# Patient Record
Sex: FEMALE | Race: WHITE | NOT HISPANIC OR LATINO | Employment: UNEMPLOYED | URBAN - METROPOLITAN AREA
[De-identification: names, ages, dates, MRNs, and addresses within clinical notes are randomized per-mention and may not be internally consistent; named-entity substitution may affect disease eponyms.]

---

## 2018-03-23 ENCOUNTER — OFFICE VISIT (OUTPATIENT)
Dept: OBGYN | Facility: CLINIC | Age: 28
End: 2018-03-23
Payer: COMMERCIAL

## 2018-03-23 VITALS
HEART RATE: 81 BPM | HEIGHT: 66 IN | BODY MASS INDEX: 19.09 KG/M2 | WEIGHT: 118.8 LBS | SYSTOLIC BLOOD PRESSURE: 108 MMHG | OXYGEN SATURATION: 99 % | DIASTOLIC BLOOD PRESSURE: 62 MMHG | TEMPERATURE: 97 F

## 2018-03-23 DIAGNOSIS — Z34.90 UNPLANNED PREGNANCY: ICD-10-CM

## 2018-03-23 DIAGNOSIS — N91.2 ABSENCE OF MENSTRUATION: Primary | ICD-10-CM

## 2018-03-23 LAB — BETA HCG QUAL IFA URINE: POSITIVE

## 2018-03-23 PROCEDURE — 99201 ZZC OFFICE/OUTPT VISIT, NEW, LEVEL I: CPT | Performed by: NURSE PRACTITIONER

## 2018-03-23 PROCEDURE — 84703 CHORIONIC GONADOTROPIN ASSAY: CPT | Performed by: NURSE PRACTITIONER

## 2018-03-23 ASSESSMENT — PAIN SCALES - GENERAL: PAINLEVEL: NO PAIN (0)

## 2018-03-23 NOTE — PROGRESS NOTES
"S: Pt is a 27 year old,  2 para 2 who presents today with absence of menses. LMP was 18.    This was an unplanned pregnancy and she does not plan to continue it. Would like to discuss termination procedures.   Pertinent Past Obstetric and Gynecological Medical History:  x 2-1  delivery at 35 weeks due to  labor   Patient past medical, surgical, family, and social history reviewed.    O: General appearance: Well appearing female in no acute distress. Answers questions and maintains eye contact appropriately.  Exam declined  UPT Positive    A/P:  (N91.2) Absence of menstruation  (primary encounter diagnosis)  Comment: Patient confirmed she does not plan to continue this pregnancy and wants it \"dealt with\" as soon as possible. We discussed termination options-surgical vs medical. Handout regarding locations and phone numbers she can call to schedule an appointment given. Patient encouraged to please reach out to me if she has any questions, concerns. Also, can return to clinic at anytime to establish prenatal care in the event she changes her mind.  Plan: Beta HCG qual IFA urine - [facility] and [place]          (Z34.90) Unplanned pregnancy  Comment: see above    Lisa MCPHERSON CNP      "

## 2018-03-23 NOTE — MR AVS SNAPSHOT
"              After Visit Summary   3/23/2018    Vi Zayas    MRN: [id number]           Patient Information     Date Of Birth          1990        Visit Information        Provider Department      3/23/2018 1:35 PM Lisa Zaragoza APRN CNP; [facility]        Today's Diagnoses     Absence of menstruation    -  1    Unplanned pregnancy           Follow-ups after your visit        Who to contact     If you have questions or need follow up information about today's clinic visit or your schedule please contact [facility] directly at [phone].  Normal or non-critical lab and imaging results will be communicated to you by [employer], letter or phone within 4 business days after the clinic has received the results. If you do not hear from us within 7 days, please contact the clinic through [employer] or phone. If you have a critical or abnormal lab result, we will notify you by phone as soon as possible.  Submit refill requests through [employer] or call your pharmacy and they will forward the refill request to us. Please allow 3 business days for your refill to be completed.          Additional Information About Your Visit        MyChart Information     [employer] lets you send messages to your doctor, view your test results, renew your prescriptions, schedule appointments and more. To sign up, go to [email or web address] . Click on \"Log in\" on the left side of the screen, which will take you to the Welcome page. Then click on \"Sign up Now\" on the right side of the page.     You will be asked to enter the access code listed below, as well as some personal information. Please follow the directions to create your username and password.     Your access code is: [email or web address]  Expires: 2018  2:29 PM     Your access code will  in 90 days. If you need help or a new code, please call your [facility]) or [phone].        Care EveryWhere ID     This is your Care " "EveryWhere ID. This could be used by other organizations to access your [facility] medical records  [id number]        Your Vitals Were     Pulse Temperature Height Last Period Pulse Oximetry BMI (Body Mass Index)    81 97  F (36.1  C) (Oral) 5' 6.25\" (1.683 m) 02/19/2018 (Exact Date) 99% 19.03 kg/m2       Blood Pressure from Last 3 Encounters:   03/23/18 108/62    Weight from Last 3 Encounters:   03/23/18 118 lb 12.8 oz (53.9 kg)              We Performed the Following     Beta HCG qual IFA urine - [facility] and [facility]        Primary Care Provider Office Phone # Fax #    [facility] [phone] [address] [facility] [address]        Equal Access to Services     ANDREW RUIZ : Hadii aad ku hadasho Sodomenico, waaxda luqadaha, qaybta kaalmada adeegyada, paul bullock . So [facility] [phone].    ATENCIÓN: Si habla español, tiene a bhardwaj disposición servicios gratuitos de asistencia lingüística. Brandoname al [phone].    We comply with applicable federal civil rights laws and [place] laws. We do not discriminate on the basis of race, color, national origin, age, disability, sex, sexual orientation, or gender identity.            Thank you!     Thank you for choosing [facility]  for your care. Our goal is always to provide you with excellent care. Hearing back from our patients is one way we can continue to improve our services. Please take a few minutes to complete the written survey that you may receive in the mail after your visit with us. Thank you!             Your Updated Medication List - Protect others around you: Learn how to safely use, store and throw away your medicines at www.disposemymeds.org.      Notice  As of 3/23/2018  2:29 PM    You have not been prescribed any medications.      "

## 2018-03-23 NOTE — NURSING NOTE
"Chief Complaint   Patient presents with     Confirmation Of Pregnancy       Initial /62  Pulse 81  Temp 97  F (36.1  C) (Oral)  Ht 5' 6.25\" (1.683 m)  Wt 118 lb 12.8 oz (53.9 kg)  LMP 02/19/2018 (Exact Date)  SpO2 99%  BMI 19.03 kg/m2 Estimated body mass index is 19.03 kg/(m^2) as calculated from the following:    Height as of this encounter: 5' 6.25\" (1.683 m).    Weight as of this encounter: 118 lb 12.8 oz (53.9 kg)..  BP completed using cuff size: nestor Samuel CMA    "

## 2018-03-24 ASSESSMENT — PATIENT HEALTH QUESTIONNAIRE - PHQ9: SUM OF ALL RESPONSES TO PHQ QUESTIONS 1-9: 12

## 2019-04-30 ENCOUNTER — OFFICE VISIT (OUTPATIENT)
Dept: OBGYN | Facility: CLINIC | Age: 29
End: 2019-04-30
Payer: COMMERCIAL

## 2019-04-30 VITALS
HEIGHT: 66 IN | WEIGHT: 119.6 LBS | DIASTOLIC BLOOD PRESSURE: 70 MMHG | SYSTOLIC BLOOD PRESSURE: 102 MMHG | BODY MASS INDEX: 19.22 KG/M2 | OXYGEN SATURATION: 97 % | TEMPERATURE: 98.2 F | HEART RATE: 78 BPM

## 2019-04-30 DIAGNOSIS — Z30.41 ENCOUNTER FOR SURVEILLANCE OF CONTRACEPTIVE PILLS: Primary | ICD-10-CM

## 2019-04-30 PROCEDURE — 99213 OFFICE O/P EST LOW 20 MIN: CPT | Performed by: NURSE PRACTITIONER

## 2019-04-30 RX ORDER — NORGESTIMATE AND ETHINYL ESTRADIOL 0.25-0.035
1 KIT ORAL DAILY
COMMUNITY
End: 2019-04-30

## 2019-04-30 RX ORDER — NORGESTIMATE AND ETHINYL ESTRADIOL 0.25-0.035
1 KIT ORAL DAILY
Qty: 84 TABLET | Refills: 1 | Status: SHIPPED | OUTPATIENT
Start: 2019-04-30 | End: 2019-10-19

## 2019-04-30 ASSESSMENT — PAIN SCALES - GENERAL: PAINLEVEL: NO PAIN (0)

## 2019-04-30 ASSESSMENT — MIFFLIN-ST. JEOR: SCORE: 1303.22

## 2019-04-30 NOTE — PROGRESS NOTES
"  SUBJECTIVE:   Vi Zayas is a 28 year old female who presents to clinic today for the following   health issues:      Medication Followup of Ortho Cyclen    Taking Medication as prescribed: yes    Side Effects:  None    Medication Helping Symptoms:  yes       Patient was seen for pregnancy test last March and test was positive. Per visit notes, was planning on termination as it was an unplanned pregnancy. Patient will not say for sure if she terminated or miscarried. She was started on oral contraceptive pills by another provider and needs refills at this time. She will not do a pap smear or preventative exam, just requests refills on her current regimen. No trouble remembering to take it regularly, no adverse side effects. Cycles are regular.  No other concerns today.     Additional history: as documented    Reviewed  and updated as needed this visit by clinical staff  Tobacco  Meds  Med Hx  Surg Hx  Fam Hx  Soc Hx        Reviewed and updated as needed this visit by Provider         There is no problem list on file for this patient.    Past Surgical History:   Procedure Laterality Date     NO HISTORY OF SURGERY         Social History     Tobacco Use     Smoking status: Never Smoker     Smokeless tobacco: Never Used   Substance Use Topics     Alcohol use: No     Family History   Problem Relation Age of Onset     Stomach Cancer Paternal Grandmother            ROS:  Constitutional, HEENT, cardiovascular, pulmonary, gi and gu systems are negative, except as otherwise noted.    OBJECTIVE:     /70 (BP Location: Right arm, Patient Position: Sitting, Cuff Size: Adult Regular)   Pulse 78   Temp 98.2  F (36.8  C) (Oral)   Ht 1.683 m (5' 6.25\")   Wt 54.3 kg (119 lb 9.6 oz)   LMP 04/09/2019 (Exact Date)   SpO2 97%   BMI 19.16 kg/m    Body mass index is 19.16 kg/m .  GENERAL: healthy, alert and no distress  RESP: lungs clear to auscultation - no rales, rhonchi or wheezes  CV: regular rate and rhythm, normal " S1 S2, no S3 or S4, no murmur, click or rub, no peripheral edema and peripheral pulses strong  ABDOMEN: soft, nontender, no hepatosplenomegaly, no masses and bowel sounds normal   (female): patient declined  MS: no gross musculoskeletal defects noted, no edema  SKIN: no suspicious lesions or rashes  PSYCH: mentation appears normal, affect normal/bright    ASSESSMENT/PLAN:   1. Encounter for surveillance of contraceptive pills  Will refill at this time. Discussed recommendation for annual wellness exam and also pap smear-she declines both, but will think about it in the next few months. Encouraged to return to clinic if she would like to complete this.   - norgestimate-ethinyl estradiol (ORTHO-CYCLEN/SPRINTEC) 0.25-35 MG-MCG tablet; Take 1 tablet by mouth daily  Dispense: 84 tablet; Refill: 1    KY Ziegler [facility]

## 2019-04-30 NOTE — PROGRESS NOTES
"  SUBJECTIVE:   Vi Zayas is a 28 year old female who presents to clinic today for the following   health issues:      Birth control counseling    {additional problems for provider to add:197730}    Additional history: {NONE - AS DOCUMENTED:367815::\"as documented\"}    Reviewed  and updated as needed this visit by clinical staff         Reviewed and updated as needed this visit by Provider         {HIST REVIEW/ LINKS 2:377623}    {PROVIDER CHARTING PREFERENCE:047997}      "

## 2019-06-18 ENCOUNTER — OFFICE VISIT (OUTPATIENT)
Dept: FAMILY MEDICINE | Facility: CLINIC | Age: 29
End: 2019-06-18
Payer: COMMERCIAL

## 2019-06-18 VITALS
TEMPERATURE: 98.4 F | DIASTOLIC BLOOD PRESSURE: 70 MMHG | SYSTOLIC BLOOD PRESSURE: 107 MMHG | WEIGHT: 122 LBS | HEART RATE: 77 BPM | BODY MASS INDEX: 19.54 KG/M2

## 2019-06-18 DIAGNOSIS — R30.0 DYSURIA: ICD-10-CM

## 2019-06-18 DIAGNOSIS — N30.00 ACUTE CYSTITIS WITHOUT HEMATURIA: Primary | ICD-10-CM

## 2019-06-18 DIAGNOSIS — R82.90 NONSPECIFIC FINDING ON EXAMINATION OF URINE: ICD-10-CM

## 2019-06-18 LAB
ALBUMIN UR-MCNC: NEGATIVE MG/DL
APPEARANCE UR: CLEAR
BILIRUB UR QL STRIP: NEGATIVE
COLOR UR AUTO: YELLOW
GLUCOSE UR STRIP-MCNC: NEGATIVE MG/DL
HGB UR QL STRIP: ABNORMAL
KETONES UR STRIP-MCNC: NEGATIVE MG/DL
LEUKOCYTE ESTERASE UR QL STRIP: ABNORMAL
NITRATE UR QL: NEGATIVE
PH UR STRIP: 7 PH (ref 5–7)
RBC #/AREA URNS AUTO: ABNORMAL /HPF
SOURCE: ABNORMAL
SP GR UR STRIP: <=1.005 (ref 1–1.03)
UROBILINOGEN UR STRIP-ACNC: 0.2 EU/DL (ref 0.2–1)
WBC #/AREA URNS AUTO: ABNORMAL /HPF

## 2019-06-18 PROCEDURE — 81001 URINALYSIS AUTO W/SCOPE: CPT | Performed by: PHYSICIAN ASSISTANT

## 2019-06-18 PROCEDURE — 99203 OFFICE O/P NEW LOW 30 MIN: CPT | Performed by: PHYSICIAN ASSISTANT

## 2019-06-18 PROCEDURE — 87086 URINE CULTURE/COLONY COUNT: CPT | Performed by: PHYSICIAN ASSISTANT

## 2019-06-18 RX ORDER — SULFAMETHOXAZOLE/TRIMETHOPRIM 800-160 MG
1 TABLET ORAL 2 TIMES DAILY
Qty: 6 TABLET | Refills: 0 | Status: SHIPPED | OUTPATIENT
Start: 2019-06-18 | End: 2019-06-21

## 2019-06-18 NOTE — PROGRESS NOTES
Subjective     Vi Zayas is a 28 year old female who presents to clinic today for the following health issues:    HPI   URINARY TRACT SYMPTOMS      Duration: X 3 days    Description  Burning with urination, frequency    Intensity:  moderate    Accompanying signs and symptoms:  Fever/chills: no   Flank pain no   Nausea and vomiting: no   Vaginal symptoms: discharge- slight  Abdominal/Pelvic Pain: no     History  History of frequent UTI's: no   History of kidney stones: no   Sexually Active: no   Possibility of pregnancy: No    Precipitating or alleviating factors: None    Therapies tried and outcome: none  LMP- 1 week ago, on OCP's. No missed pills     [place]  FMHX- no diabetes      No Known Allergies    Past Medical History:   Diagnosis Date     NO ACTIVE PROBLEMS          Current Outpatient Medications on File Prior to Visit:  norgestimate-ethinyl estradiol (ORTHO-CYCLEN/SPRINTEC) 0.25-35 MG-MCG tablet Take 1 tablet by mouth daily     No current facility-administered medications on file prior to visit.     Social History     Tobacco Use     Smoking status: Never Smoker     Smokeless tobacco: Never Used   Substance Use Topics     Alcohol use: No     Drug use: No       ROS:  CONSTITUTIONAL: Negative for fatigue or fever.  EYES: Negative for eye problems.  ENT: neg.  RESP: neg.  CV: Negative for chest pains.  GI: Negative for vomiting.  MUSCULOSKELETAL:  Negative for significant muscle or joint pains.  NEUROLOGIC: Negative for headaches.  SKIN: Negative for rash.  - as above  Psych- nl mentation        OBJECTIVE:  /70   Pulse 77   Temp 98.4  F (36.9  C) (Oral)   Wt 55.3 kg (122 lb)   BMI 19.54 kg/m     General:   awake, alert, and cooperative.  NAD.   Head: Normocephalic, atraumatic.  Eyes: Conjunctiva clear, non icteric.   ABD: soft, no tenderness to palpation , no rigidity, guarding or rebound . No CVAT  Neuro: Alert and oriented - normal speech.   Results for  orders placed or performed in visit on 06/18/19   *UA reflex to Microscopic and Culture ([facility] (except [place] and [place])   Result Value Ref Range    Color Urine Yellow     Appearance Urine Clear     Glucose Urine Negative NEG^Negative mg/dL    Bilirubin Urine Negative NEG^Negative    Ketones Urine Negative NEG^Negative mg/dL    Specific Gravity Urine <=1.005 1.003 - 1.035    Blood Urine Small (A) NEG^Negative    pH Urine 7.0 5.0 - 7.0 pH    Protein Albumin Urine Negative NEG^Negative mg/dL    Urobilinogen Urine 0.2 0.2 - 1.0 EU/dL    Nitrite Urine Negative NEG^Negative    Leukocyte Esterase Urine Moderate (A) NEG^Negative    Source Midstream Urine    Urine Microscopic   Result Value Ref Range    WBC Urine 5-10 (A) OTO5^0 - 5 /HPF    RBC Urine O - 2 OTO2^O - 2 /HPF       ASSESSMENT:      ICD-10-CM    1. Acute cystitis without hematuria N30.00 sulfamethoxazole-trimethoprim (BACTRIM DS) 800-160 MG tablet   2. Dysuria R30.0 *UA reflex to Microscopic and Culture ([facility] and [facility] (except [place] and [place])     Urine Microscopic   3. Nonspecific finding on examination of urine R82.90 Urine Culture Aerobic Bacterial           PLAN: UC pending  As per ordered above.  Drink plenty of fluids.  Prevention and treatment of UTI's discussed. Follow up with primary care physician 3-5 days if not improving.  Advised about symptoms which might herald more serious problems.      Vandana Dunn PA-C

## 2019-06-18 NOTE — LETTER
June 20, 2019    Vi Zayas  [address]  [place] [address]        Dear Vi,    Your urine culture was negative. You may stop the antibiotic and follow up with your Primary Care Provider if no resolution of symptoms.        Vandana Dunn PA-C    Results for orders placed or performed in visit on 06/18/19   *UA reflex to Microscopic and Culture ([facility] and [facility] (except [place] and [place])   Result Value Ref Range    Color Urine Yellow     Appearance Urine Clear     Glucose Urine Negative NEG^Negative mg/dL    Bilirubin Urine Negative NEG^Negative    Ketones Urine Negative NEG^Negative mg/dL    Specific Gravity Urine <=1.005 1.003 - 1.035    Blood Urine Small (A) NEG^Negative    pH Urine 7.0 5.0 - 7.0 pH    Protein Albumin Urine Negative NEG^Negative mg/dL    Urobilinogen Urine 0.2 0.2 - 1.0 EU/dL    Nitrite Urine Negative NEG^Negative    Leukocyte Esterase Urine Moderate (A) NEG^Negative    Source Midstream Urine    Urine Microscopic   Result Value Ref Range    WBC Urine 5-10 (A) OTO5^0 - 5 /HPF    RBC Urine O - 2 OTO2^O - 2 /HPF   Urine Culture Aerobic Bacterial   Result Value Ref Range    Specimen Description Midstream Urine     Culture Micro No growth

## 2019-06-19 LAB
BACTERIA SPEC CULT: NO GROWTH
SPECIMEN SOURCE: NORMAL

## 2019-10-19 DIAGNOSIS — Z30.41 ENCOUNTER FOR SURVEILLANCE OF CONTRACEPTIVE PILLS: ICD-10-CM

## 2019-10-21 RX ORDER — NORGESTIMATE AND ETHINYL ESTRADIOL 0.25-0.035
KIT ORAL
Qty: 84 TABLET | Refills: 0 | Status: SHIPPED | OUTPATIENT
Start: 2019-10-21 | End: 2020-01-08

## 2019-10-21 NOTE — TELEPHONE ENCOUNTER
"Contraceptives Protocol Passed   ESTARYLLA 0.25-35 MG-MCG tablet [Pharmacy Med Name: ESTARYLLA TABLETS 28S]   Rerun Protocol (10/21/2019 3:46 PM)      Patient is not a current smoker if age is 35 or older         Recent (12 mo) or future (30 days) visit within the authorizing provider's specialty      Patient has had an office visit with the authorizing provider or a provider within the authorizing providers department within the previous 12 mos or has a future within next 30 days. See \"Patient Info\" tab in inbasket, or \"Choose Columns\" in Meds & Orders section of the refill encounter.              Medication is active on med list         No active pregnancy on record         No positive pregnancy test in past 12 months             Prescription approved per [facility] Refill Protocol.      Last written prescription date: 4/30/2019        Last fill quantity: 84, # refills: 1        Last office visit: 4/30/2019        Future office visit: Not scheduled.     Yael Arellano RN on 10/21/2019 at 4:00 PM    "

## 2020-01-08 DIAGNOSIS — Z30.41 ENCOUNTER FOR SURVEILLANCE OF CONTRACEPTIVE PILLS: ICD-10-CM

## 2020-01-08 RX ORDER — NORGESTIMATE AND ETHINYL ESTRADIOL 0.25-0.035
KIT ORAL
Qty: 84 TABLET | Refills: 0 | Status: SHIPPED | OUTPATIENT
Start: 2020-01-08 | End: 2020-05-27

## 2020-01-08 NOTE — TELEPHONE ENCOUNTER
"Contraceptives Protocol Passed   ESTARYLLA 0.25-35 MG-MCG tablet [Pharmacy Med Name: ESTARYLLA TABLETS 28S]   Rerun Protocol (1/8/2020 11:19 AM)      Patient is not a current smoker if age is 35 or older         Recent (12 mo) or future (30 days) visit within the authorizing provider's specialty      Patient has had an office visit with the authorizing provider or a provider within the authorizing providers department within the previous 12 mos or has a future within next 30 days. See \"Patient Info\" tab in inbasket, or \"Choose Columns\" in Meds & Orders section of the refill encounter.              Medication is active on med list         No active pregnancy on record         No positive pregnancy test in past 12 months               Last written prescription date: 10/21/2019        Last fill quantity: 84, # refills: 0        Last office visit: 4/330/2019        Future office visit: Not scheduled.       Prescription approved per [facility] Refill Protocol.  Patient will be due for annual exam come April 2020.     Yael Arellano RN on 1/8/2020 at 11:32 AM    "

## 2020-05-26 DIAGNOSIS — Z30.41 ENCOUNTER FOR SURVEILLANCE OF CONTRACEPTIVE PILLS: ICD-10-CM

## 2020-05-27 RX ORDER — NORGESTIMATE AND ETHINYL ESTRADIOL 0.25-0.035
KIT ORAL
Qty: 84 TABLET | Refills: 0 | Status: SHIPPED | OUTPATIENT
Start: 2020-05-27 | End: 2020-08-26

## 2020-05-27 NOTE — TELEPHONE ENCOUNTER
"Requested Prescriptions   Pending Prescriptions Disp Refills     ESTARYLLA 0.25-35 MG-MCG tablet [Pharmacy Med Name: ESTARYLLA TABLETS 28S] 84 tablet 0     Sig: TAKE 1 TABLET BY MOUTH DAILY       Contraceptives Protocol Failed - 5/27/2020 11:50 AM        Failed - Recent (12 mo) or future (30 days) visit within the authorizing provider's specialty     Patient has had an office visit with the authorizing provider or a provider within the authorizing providers department within the previous 12 mos or has a future within next 30 days. See \"Patient Info\" tab in inbasket, or \"Choose Columns\" in Meds & Orders section of the refill encounter.              Passed - Patient is not a current smoker if age is 35 or older        Passed - Medication is active on med list        Passed - No active pregnancy on record        Passed - No positive pregnancy test in past 12 months           Routing refill request to provider for review/approval because:  Patient needs to be seen because it has been more than 1 year since last office visit.  No pap on file.  Per 4/30/2019 OV notes:  She was started on oral contraceptive pills by another provider and needs refills at this time. She will not do a pap smear or preventative exam, just requests refills on her current regimen. No trouble remembering to take it regularly, no adverse side effects  1. Encounter for surveillance of contraceptive pills  Will refill at this time. Discussed recommendation for annual wellness exam and also pap smear-she declines both, but will think about it in the next few months. Encouraged to return to clinic if she would like to complete this.   Pt has not followed up since.    Sharron Olson RN          "

## 2020-08-21 DIAGNOSIS — Z30.41 ENCOUNTER FOR SURVEILLANCE OF CONTRACEPTIVE PILLS: ICD-10-CM

## 2020-08-24 RX ORDER — NORGESTIMATE AND ETHINYL ESTRADIOL 0.25-0.035
KIT ORAL
OUTPATIENT
Start: 2020-08-24

## 2020-08-24 NOTE — TELEPHONE ENCOUNTER
"Contraceptives Protocol Failed     Rerun Protocol  (8/24/2020 11:27 AM)       Recent (12 mo) or future (30 days) visit within the authorizing provider's specialty     Patient has had an office visit with the authorizing provider or a provider within the authorizing providers department within the previous 12 mos or has a future within next 30 days. See \"Patient Info\" tab in inbasket, or \"Choose Columns\" in Meds & Orders section of the refill encounter.              Patient is not a current smoker if age is 35 or older         Medication is active on med list         No active pregnancy on record         No positive pregnancy test in past 12 months           Pt last seen 4/30/2019 for surveillance of OCPs.    Last prescribed on 5/27/2020 for 84 tablets with 0 refills.    RN called pt with  and assisted in scheduling annual exam with Lisa. Pt states she has enough medication to get to her appt.    Patient verbalized understanding and agreed to plan.   Patient was given the opportunity to ask additional questions and have them answered. Patient had no further questions.     Kelsy Dumas RN on 8/24/2020 at 11:37 AM    "

## 2020-08-26 ENCOUNTER — OFFICE VISIT (OUTPATIENT)
Dept: OBGYN | Facility: CLINIC | Age: 30
End: 2020-08-26
Payer: COMMERCIAL

## 2020-08-26 VITALS
HEIGHT: 67 IN | SYSTOLIC BLOOD PRESSURE: 127 MMHG | WEIGHT: 117.4 LBS | BODY MASS INDEX: 18.43 KG/M2 | OXYGEN SATURATION: 98 % | HEART RATE: 112 BPM | TEMPERATURE: 99 F | DIASTOLIC BLOOD PRESSURE: 83 MMHG

## 2020-08-26 DIAGNOSIS — Z01.419 ENCOUNTER FOR GYNECOLOGICAL EXAMINATION WITHOUT ABNORMAL FINDING: Primary | ICD-10-CM

## 2020-08-26 DIAGNOSIS — Z30.41 ENCOUNTER FOR SURVEILLANCE OF CONTRACEPTIVE PILLS: ICD-10-CM

## 2020-08-26 PROCEDURE — 99395 PREV VISIT EST AGE 18-39: CPT | Performed by: NURSE PRACTITIONER

## 2020-08-26 PROCEDURE — G0145 SCR C/V CYTO,THINLAYER,RESCR: HCPCS | Performed by: NURSE PRACTITIONER

## 2020-08-26 RX ORDER — NORGESTIMATE AND ETHINYL ESTRADIOL 0.25-0.035
1 KIT ORAL DAILY
Qty: 84 TABLET | Refills: 3 | Status: SHIPPED | OUTPATIENT
Start: 2020-08-26 | End: 2021-07-23

## 2020-08-26 ASSESSMENT — PAIN SCALES - GENERAL: PAINLEVEL: NO PAIN (0)

## 2020-08-26 ASSESSMENT — MIFFLIN-ST. JEOR: SCORE: 1287.21

## 2020-08-26 NOTE — LETTER
September 8, 2020      Vi Zayas  [address]  [place] [address]    Dear ,      I am happy to inform you that your recent cervical cancer screening test (PAP smear) was normal.      Preventative screenings such as this help to ensure your health for years to come. You should repeat a pap smear in 3 years, unless otherwise directed.      You will still need to return to the clinic every year for your annual exam and other preventive tests.     If you have additional questions regarding this result, please call our registered nurseSu at [phone].      Sincerely,      [place] Ziegler CNP/rlm

## 2020-08-26 NOTE — PROGRESS NOTES
SUBJECTIVE:   CC: Vi Zayas is an 30 year old woman who presents for preventive health visit.   Patient declined interpretor for the visit today.    Healthy Habits:    Do you get at least three servings of calcium containing foods daily (dairy, green leafy vegetables, etc.)? yes    Amount of exercise or daily activities, outside of work: 5 day(s) per week    Problems taking medications regularly No    Medication side effects: No    Have you had an eye exam in the past two years? no    Do you see a dentist twice per year? yes    Do you have sleep apnea, excessive snoring or daytime drowsiness?no          Today's PHQ-2 Score:   PHQ-2 ( 1999 Pf[employer]) 8/26/2020   Q1: Little interest or pleasure in doing things 0   Q2: Feeling down, depressed or hopeless 0   PHQ-2 Score 0       Abuse: Current or Past(Physical, Sexual or Emotional)- No  Do you feel safe in your environment? Yes        Social History     Tobacco Use     Smoking status: Never Smoker     Smokeless tobacco: Never Used   Substance Use Topics     Alcohol use: No     If you drink alcohol do you typically have >3 drinks per day or >7 drinks per week? No                     Reviewed orders with patient.  Reviewed health maintenance and updated orders accordingly - Yes  There is no problem list on file for this patient.    Past Surgical History:   Procedure Laterality Date     NO HISTORY OF SURGERY         Social History     Tobacco Use     Smoking status: Never Smoker     Smokeless tobacco: Never Used   Substance Use Topics     Alcohol use: No     Family History   Problem Relation Age of Onset     Stomach Cancer Paternal Grandmother            Mammogram not appropriate for this patient based on age.    Pertinent mammograms are reviewed under the imaging tab.  History of abnormal Pap smear: NO - age 21-29 PAP every 3 years recommended     Reviewed and updated as needed this visit by clinical staff  Tobacco  Allergies  Meds  Med Hx  Surg Hx  Fam Hx  Soc Hx  "       Reviewed and updated as needed this visit by Provider        Past Medical History:   Diagnosis Date     NO ACTIVE PROBLEMS       Past Surgical History:   Procedure Laterality Date     NO HISTORY OF SURGERY         ROS:  CONSTITUTIONAL: NEGATIVE for fever, chills, change in weight  INTEGUMENTARU/SKIN: NEGATIVE for worrisome rashes, moles or lesions  EYES: NEGATIVE for vision changes or irritation  ENT: NEGATIVE for ear, mouth and throat problems  RESP: NEGATIVE for significant cough or SOB  BREAST: NEGATIVE for masses, tenderness or discharge  CV: NEGATIVE for chest pain, palpitations or peripheral edema  GI: NEGATIVE for nausea, abdominal pain, heartburn, or change in bowel habits  : NEGATIVE for unusual urinary or vaginal symptoms. Periods are regular.  MUSCULOSKELETAL: NEGATIVE for significant arthralgias or myalgia  NEURO: NEGATIVE for weakness, dizziness or paresthesias  PSYCHIATRIC: NEGATIVE for changes in mood or affect    OBJECTIVE:   /83 (BP Location: Right arm, Patient Position: Sitting, Cuff Size: Adult Regular)   Pulse 112   Temp 99  F (37.2  C) (Tympanic)   Ht 1.689 m (5' 6.5\")   Wt 53.3 kg (117 lb 6.4 oz)   LMP 08/12/2020 (Exact Date)   SpO2 98%   BMI 18.66 kg/m    EXAM:  GENERAL: healthy, alert and no distress  EYES: Eyes grossly normal to inspection, PERRL and conjunctivae and sclerae normal  HENT: ear canals and TM's normal, nose and mouth without ulcers or lesions  NECK: no adenopathy, no asymmetry, masses, or scars and thyroid normal to palpation  RESP: lungs clear to auscultation - no rales, rhonchi or wheezes  BREAST: normal without masses, tenderness or nipple discharge and no palpable axillary masses or adenopathy  CV: regular rate and rhythm, normal S1 S2, no S3 or S4, no murmur, click or rub, no peripheral edema and peripheral pulses strong  ABDOMEN: soft, nontender, no hepatosplenomegaly, no masses and bowel sounds normal   (female): normal female external genitalia, " "normal urethral meatus, vaginal mucosa pink, moist, well rugated, and normal cervix/adnexa/uterus without masses or discharge  MS: no gross musculoskeletal defects noted, no edema  SKIN: no suspicious lesions or rashes  NEURO: Normal strength and tone, mentation intact and speech normal  PSYCH: mentation appears normal, affect normal/bright    ASSESSMENT/PLAN:   1. Encounter for gynecological examination without abnormal finding  - Pap imaged thin layer screen reflex to HPV if ASCUS - recommend age 25 - 29    2. Encounter for surveillance of contraceptive pills  - norgestimate-ethinyl estradiol (ESTARYLLA) 0.25-35 MG-MCG tablet; Take 1 tablet by mouth daily  Dispense: 84 tablet; Refill: 3    COUNSELING:   Reviewed preventive health counseling, as reflected in patient instructions  Special attention given to:        Regular exercise       Healthy diet/nutrition       Contraception       Safe sex practices/STD prevention       HIV screeninx in teen years, 1x in adult years, and at intervals if high risk    Estimated body mass index is 18.66 kg/m  as calculated from the following:    Height as of this encounter: 1.689 m (5' 6.5\").    Weight as of this encounter: 53.3 kg (117 lb 6.4 oz).         reports that she has never smoked. She has never used smokeless tobacco.      Counseling Resources:  ATP IV Guidelines  Pooled Cohorts Equation Calculator  Breast Cancer Risk Calculator  FRAX Risk Assessment  ICSI Preventive Guidelines  Dietary Guidelines for Americans,   USDA's MyPlate  ASA Prophylaxis  Lung CA Screening    [facility]  "

## 2020-08-31 LAB
COPATH REPORT: NORMAL
PAP: NORMAL

## 2021-02-02 ENCOUNTER — OFFICE VISIT (OUTPATIENT)
Dept: DERMATOLOGY | Facility: CLINIC | Age: 31
End: 2021-02-02
Payer: COMMERCIAL

## 2021-02-02 DIAGNOSIS — L70.0 ACNE VULGARIS: Primary | ICD-10-CM

## 2021-02-02 PROCEDURE — 99204 OFFICE O/P NEW MOD 45 MIN: CPT | Performed by: DERMATOLOGY

## 2021-02-02 ASSESSMENT — PAIN SCALES - GENERAL: PAINLEVEL: NO PAIN (0)

## 2021-02-02 NOTE — NURSING NOTE
"Vi Zayas's goals for this visit include:   Chief Complaint   Patient presents with     Derm Problem     Perioral erythema with spots x has had it for 2 years now (seasonal only); dry and itchy sometimes. She has tried \"baby creams\" to the areas in the past with no resolution. No current treatments.       New Patient     No personal of family hx of skin cancer        She requests these members of her care team be copied on today's visit information: Yes     PCP: Clinic, [place]    Referring Provider:  No referring provider defined for this encounter.    There were no vitals taken for this visit.    Do you need any medication refills at today's visit? No   [id number], MEDICAL ASSISTANT       "

## 2021-02-02 NOTE — PROGRESS NOTES
"[facility] Dermatology Note  Encounter Date: Feb 2, 2021  Office Visit     Dermatology Problem List:  1. No personal or family history of skin cancer   2. Acne vulgaris with possible dermatitis  - Current tx: metrocream    ____________________________________________    Assessment & Plan:  1. Acne vulgaris with possible perioral dermatitis, likely flared with mask use  - Start metrocream BID  - Hold baby creams  - Ok to continue Joceline lauder and Lancome beauty products   - Ok to continue Crest toothpaste. Counseled on use. Avoid leaving fluoride on askin  - Patient opts for in-person follow up in 6 weeks  -recommend cetaphil products    2. Plan total skin exam at follow up  Procedures Performed:   NA      Follow-up: 6 weeks, earlier for new or changing lesions.    Staff and Scribe:     Scribe Disclosure  I, Shalonda Orourke, am serving as a scribe to document services personally performed by Dr. Lousia Malone MD, based on data collection and the provider's statements to me.     Provider Disclosure:   The documentation recorded by the scribe accurately reflects the services I personally performed and the decisions made by me.    Louisa Malone MD    Department of Dermatology  [facility]: Phone: [phone], Fax:[phone]  [facility]: Phone: [phone], Fax: [phone]      ____________________________________________    CC: Derm Problem (Perioral erythema with spots x has had it for 2 years now (seasonal only); dry and itchy sometimes. She has tried \"baby creams\" to the areas in the past with no resolution. No current treatments.  ) and New Patient (No personal of family hx of skin cancer )    HPI:  Ms. Vi Zayas is a(n) 30 year old female who presents today as a new patient for perioral erythema. The patient has not been seen in the dermatology clinic before. " "    Today, the patient reports that she has had perioral erythema with spots for about 2 years now, seasonal only. This year she has had more spots present. It is like a rash, dry, itchy, and red sometimes. She has tried \"baby creams\" to areas in the past, but has not noticed any improvement. She is using an Joceline Lauder cream.     Not pregnant or breast feeding.       present via phone.     Patient is otherwise feeling well, without additional concerns.    Labs:  NA    Physical Exam:  Vitals: There were no vitals taken for this visit.  SKIN: Acne exam, which includes the face, neck, upper central chest, and upper central back was performed.  - Acneiform papules on the chin, bilateral nasolabial folds, and glabella.   - upper chest is clear, upper back is clear, central near neck line    - No other lesions of concern on areas examined.     Medications:  Current Outpatient Medications   Medication     norgestimate-ethinyl estradiol (ESTARYLLA) 0.25-35 MG-MCG tablet     No current facility-administered medications for this visit.       Past Medical History:   There is no problem list on file for this patient.    Past Medical History:   Diagnosis Date     NO ACTIVE PROBLEMS         CC No referring provider defined for this encounter. on close of this encounter.    "

## 2021-02-02 NOTE — PATIENT INSTRUCTIONS
[facility] Dermatology Visit    Thank you for allowing us to participate in your care. Your findings, instructions and follow-up plan are as follows:           When should I call my doctor?    If you are worsening or not improving, please, contact us or seek urgent care as noted below.     Who should I call with questions (adults)?    [facility] (adult and pediatric): [phone]     [facility] (adult): [phone]    For urgent needs outside of business hours call the [facility] at [phone] and ask for the dermatology resident on call    If this is a medical emergency and you are unable to reach an ER, Call 911      Who should I call with questions (pediatric)?  [facility]- Pediatric Dermatology  Dr. Darling Estrella, Dr. Eligio White, Dr. Ana Martins, Ursula Ba, PA  Dr. Joan Munguia, Dr. Armida Adrian & Dr. Camilo Aguayo  Non Urgent  Nurse Triage Line; [phone]- Elise and No RN Care Coordinators   Kristin (/Complex ) [phone]    If you need a prescription refill, please contact your pharmacy. Refills are approved or denied by our Physicians during normal business hours, Monday through Fridays  Per office policy, refills will not be granted if you have not been seen within the past year (or sooner depending on your child's condition)    Scheduling Information:  Pediatric Appointment Scheduling and Call Center [phone]  Radiology Scheduling- [phone]  Sedation Unit Scheduling- [phone]  [place] Scheduling- General [phone]; Pediatric Dermatology [phone]  Main  Services: [phone]  Algerian: [phone]  Panamanian: [phone]  Hmong/Georgian/Luxembourgish: [phone]  Preadmission Nursing Department Fax Number: [phone] (Fax all pre-operative paperwork to this number)    For urgent matters arising during evenings,  weekends, or holidays that cannot wait for normal business hours please call [phone] and ask for the Dermatology Resident On-Call to be paged.

## 2021-02-02 NOTE — LETTER
"    2/2/2021         RE: Vi Zayas  [address]  [place] [facility] [address]        Dear Colleague,    Thank you for referring your patient, Vi Zayas, to the [facility]. Please see a copy of my visit note below.    [facility] Dermatology Note  Encounter Date: Feb 2, 2021  Office Visit     Dermatology Problem List:  1. No personal or family history of skin cancer   2. Acne vulgaris with possible dermatitis  - Current tx: metrocream    ____________________________________________    Assessment & Plan:  1. Acne vulgaris with possible perioral dermatitis, likely flared with mask use  - Start metrocream BID  - Hold baby creams  - Ok to continue Joceline lauder and Lancome beauty products   - Ok to continue Crest toothpaste. Counseled on use. Avoid leaving fluoride on askin  - Patient opts for in-person follow up in 6 weeks  -recommend cetaphil products    2. Plan total skin exam at follow up  Procedures Performed:   NA      Follow-up: 6 weeks, earlier for new or changing lesions.    Staff and Scribe:     Scribe Disclosure  I, Shalonda Orourke, am serving as a scribe to document services personally performed by Dr. Louisa Malone MD, based on data collection and the provider's statements to me.     Provider Disclosure:   The documentation recorded by the scribe accurately reflects the services I personally performed and the decisions made by me.    Louisa Malone MD    Department of Dermatology  [facility]: Phone: [phone], Fax:[phone]  [facility]: Phone: [phone], Fax: [phone]      ____________________________________________    CC: Derm Problem (Perioral erythema with spots x has had it for 2 years now (seasonal only); dry and itchy sometimes. She has tried \"baby creams\" to the areas in the past with no resolution. No current treatments.  ) " "and New Patient (No personal of family hx of skin cancer )    HPI:  Ms. Vi Zayas is a(n) 30 year old female who presents today as a new patient for perioral erythema. The patient has not been seen in the dermatology clinic before.     Today, the patient reports that she has had perioral erythema with spots for about 2 years now, seasonal only. This year she has had more spots present. It is like a rash, dry, itchy, and red sometimes. She has tried \"baby creams\" to areas in the past, but has not noticed any improvement. She is using an Joceline Lauder cream.     Not pregnant or breast feeding.       present via phone.     Patient is otherwise feeling well, without additional concerns.    Labs:  NA    Physical Exam:  Vitals: There were no vitals taken for this visit.  SKIN: Acne exam, which includes the face, neck, upper central chest, and upper central back was performed.  - Acneiform papules on the chin, bilateral nasolabial folds, and glabella.   - upper chest is clear, upper back is clear, central near neck line    - No other lesions of concern on areas examined.     Medications:  Current Outpatient Medications   Medication     norgestimate-ethinyl estradiol (ESTARYLLA) 0.25-35 MG-MCG tablet     No current facility-administered medications for this visit.       Past Medical History:   There is no problem list on file for this patient.    Past Medical History:   Diagnosis Date     NO ACTIVE PROBLEMS         CC No referring provider defined for this encounter. on close of this encounter.        Again, thank you for allowing me to participate in the care of your patient.        Sincerely,        Louisa Malone MD    "

## 2021-03-16 ENCOUNTER — OFFICE VISIT (OUTPATIENT)
Dept: DERMATOLOGY | Facility: CLINIC | Age: 31
End: 2021-03-16
Payer: COMMERCIAL

## 2021-03-16 DIAGNOSIS — D48.5 NEOPLASM OF UNCERTAIN BEHAVIOR OF SKIN: ICD-10-CM

## 2021-03-16 DIAGNOSIS — R23.8 PAPULE: ICD-10-CM

## 2021-03-16 DIAGNOSIS — D22.9 MULTIPLE BENIGN NEVI: Primary | ICD-10-CM

## 2021-03-16 PROCEDURE — 11102 TANGNTL BX SKIN SINGLE LES: CPT | Mod: XS | Performed by: DERMATOLOGY

## 2021-03-16 PROCEDURE — 11300 SHAVE SKIN LESION 0.5 CM/<: CPT | Mod: XS | Performed by: DERMATOLOGY

## 2021-03-16 PROCEDURE — 88305 TISSUE EXAM BY PATHOLOGIST: CPT | Performed by: DERMATOLOGY

## 2021-03-16 PROCEDURE — 99212 OFFICE O/P EST SF 10 MIN: CPT | Mod: 25 | Performed by: DERMATOLOGY

## 2021-03-16 PROCEDURE — 11305 SHAVE SKIN LESION 0.5 CM/<: CPT | Performed by: DERMATOLOGY

## 2021-03-16 ASSESSMENT — PAIN SCALES - GENERAL: PAINLEVEL: NO PAIN (0)

## 2021-03-16 NOTE — PATIENT INSTRUCTIONS
Patient Education     Checking for Skin Cancer  You can find cancer early by checking your skin each month. There are 3 kinds of skin cancer. They are melanoma, basal cell carcinoma, and squamous cell carcinoma. Doing monthly skin checks is the best way to find new marks or skin changes. Follow the instructions below for checking your skin.   The ABCDEs of checking moles for melanoma   Check your moles or growths for signs of melanoma using ABCDE:     Asymmetry: the sides of the mole or growth don t match    Border: the edges are ragged, notched, or blurred    Color: the color within the mole or growth varies    Diameter: the mole or growth is larger than 6 mm (size of a pencil eraser)    Evolving: the size, shape, or color of the mole or growth is changing (evolving is not shown in the images below)    Checking for other types of skin cancer  Basal cell carcinoma or squamous cell carcinoma have symptoms such as:       A spot or mole that looks different from all other marks on your skin    Changes in how an area feels, such as itching, tenderness, or pain    Changes in the skin's surface, such as oozing, bleeding, or scaliness    A sore that does not heal    New swelling or redness beyond the border of a mole    Who s at risk?  Anyone can get skin cancer. But you are at greater risk if you have:     Fair skin, light-colored hair, or light-colored eyes    Many moles or abnormal moles on your skin    A history of sunburns from sunlight or tanning beds    A family history of skin cancer    A history of exposure to radiation or chemicals    A weakened immune system  If you have had skin cancer in the past, you are at risk for recurring skin cancer.   How to check your skin  Do your monthly skin checkups in front of a full-length mirror. Check all parts of your body, including your:     Head (ears, face, neck, and scalp)    Torso (front, back, and sides)    Arms (tops, undersides, upper, and lower armpits)    Hands  (palms, backs, and fingers, including under the nails)    Buttocks and genitals    Legs (front, back, and sides)    Feet (tops, soles, toes, including under the nails, and between toes)  If you have a lot of moles, take digital photos of them each month. Make sure to take photos both up close and from a distance. These can help you see if any moles change over time.   Most skin changes are not cancer. But if you see any changes in your skin, call your doctor right away. Only he or she can diagnose a problem. If you have skin cancer, seeing your doctor can be the first step toward getting the treatment that could save your life.   [employer] last reviewed this educational content on 4/1/2019 2000-2020 [employer]. [address] [place] [id number]. All rights reserved. This information is not intended as a substitute for professional medical care. Always follow your healthcare professional's instructions.       When should I call my doctor?    If you are worsening or not improving, please, contact us or seek urgent care as noted below.     Who should I call with questions (adults)?    [facility] (adult and pediatric): [phone]     [facility] (adult): [phone]    For urgent needs outside of business hours call the [facility] at [phone] and ask for the dermatology resident on call    If this is a medical emergency and you are unable to reach an ER, Call [phone]      Who should I call with questions (pediatric)?  [facility]- Pediatric Dermatology  Dr. Darling Estrella, Dr. Eligio White, Dr. Ana Martins, Ursula Ba, PA  Dr. Joan Munguia, Dr. Armida Adrian & Dr. Camilo Aguayo  Non Urgent  Nurse Triage Line; [phone]- Elise and No DEAN Care Coordinatorbryce Foster (/Complex ) [phone]    If you need a prescription refill, please contact your  pharmacy. Refills are approved or denied by our Physicians during normal business hours, Monday through Fridays  Per office policy, refills will not be granted if you have not been seen within the past year (or sooner depending on your child's condition)    Scheduling Information:  Pediatric Appointment Scheduling and Call Center [phone]  Radiology Scheduling- [phone]  Sedation Unit Scheduling- [phone]  [place] Scheduling- General [phone]; Pediatric Dermatology [phone]  Main  Services: [phone]  Colombian: [phone]  Nicaraguan: [phone]  Hmong/Scar/Palauan: [phone]  Preadmission Nursing Department Fax Number: [phone] (Fax all pre-operative paperwork to this number)    For urgent matters arising during evenings, weekends, or holidays that cannot wait for normal business hours please call [phone] and ask for the Dermatology Resident On-Call to be paged.     [facility] Dermatology Visit    Thank you for allowing us to participate in your care. Your findings, instructions and follow-up plan are as follows:     When should I call my doctor?    If you are worsening or not improving, please, contact us or seek urgent care as noted below.     Who should I call with questions (adults)?    [facility] (adult and pediatric): [phone]     [facility] (adult): [phone]    For urgent needs outside of business hours call the [facility] at [phone] and ask for the dermatology resident on call    If this is a medical emergency and you are unable to reach an ER, Call [phone]    Who should I call with questions (pediatric)?  [facility]- Pediatric Dermatology  Dr. Darling Estrella, Dr. Eligio White, Dr. Ana Martins, Ursula Ba, JUSTICE Munguia, Dr. Armida Adrian & Dr. Camilo Aguayo  Non Urgent  Nurse Triage Line; [phone]- Elies and No  RN Care Coordinators   Kristin (/Complex ) [phone]    If you need a prescription refill, please contact your pharmacy. Refills are approved or denied by our Physicians during normal business hours, Monday through Fridays  Per office policy, refills will not be granted if you have not been seen within the past year (or sooner depending on your child's condition)    Scheduling Information:  Pediatric Appointment Scheduling and Call Center [phone]  Radiology Scheduling- [phone]  Sedation Unit Scheduling- [phone]  [place] Scheduling- General [phone]; Pediatric Dermatology [phone]  Main  Services: [phone]  Yakut: [phone]  Uruguayan: [phone]  Hmong/Latvian/Vincent: [phone]  Preadmission Nursing Department Fax Number: [phone] (Fax all pre-operative paperwork to this number)    For urgent matters arising during evenings, weekends, or holidays that cannot wait for normal business hours please call [phone] and ask for the Dermatology Resident On-Call to be paged.      Wound Care After a Biopsy    What is a skin biopsy?  A skin biopsy allows the doctor to examine a very small piece of tissue under the microscope to determine the diagnosis and the best treatment for the skin condition. A local anesthetic (numbing medicine)  is injected with a very small needle into the skin area to be tested. A small piece of skin is taken from the area. Sometimes a suture (stitch) is used.     What are the risks of a skin biopsy?  I will experience scar, bleeding, swelling, pain, crusting and redness. I may experience incomplete removal or recurrence. Risks of this procedure are excessive bleeding, bruising, infection, nerve damage, numbness, thick (hypertrophic or keloidal) scar and non-diagnostic biopsy.    How should I care for my wound for the first 24 hours?    Keep the wound dry and covered for 24 hours    If it bleeds, hold direct  pressure on the area for 15 minutes. If bleeding does not stop then go to the emergency room    Avoid strenuous exercise the first 1-2 days or as your doctor instructs you    How should I care for the wound after 24 hours?    After 24 hours, remove the bandage    You may bathe or shower as normal    If you had a scalp biopsy, you can shampoo as usual and can use shower water to clean the biopsy site daily    Clean the wound twice a day with gentle soap and water    Do not scrub, be gentle    Apply white petroleum/Vaseline after cleaning the wound with a cotton swab or a clean finger, and keep the site covered with a Bandaid /bandage. Bandages are not necessary with a scalp biopsy    If you are unable to cover the site with a Bandaid /bandage, re-apply ointment 2-3 times a day to keep the site moist. Moisture will help with healing    Avoid strenuous activity for first 1-2 days    Avoid lakes, rivers, pools, and oceans until the stitches are removed or the site is healed    How do I clean my wound?    Wash hands thoroughly with soap or use hand  before all wound care    Clean the wound with gentle soap and water    Apply white petroleum/Vaseline  to wound after it is clean    Replace the Bandaid /bandage to keep the wound covered for the first few days or as instructed by your doctor    If you had a scalp biopsy, warm shower water to the area on a daily basis should suffice    What should I use to clean my wound?     Cotton-tipped applicators (Qtips )    White petroleum jelly (Vaseline ). Use a clean new container and use Q-tips to apply.    Bandaids   as needed    Gentle soap     How should I care for my wound long term?    Do not get your wound dirty    Keep up with wound care for one week or until the area is healed.    A small scab will form and fall off by itself when the area is completely healed. The area will be red and will become pink in color as it heals. Sun protection is very important for how  your scar will turn out. Sunscreen with an SPF 30 or greater is recommended once the area is healed.    You should have some soreness but it should be mild and slowly go away over several days. Talk to your doctor about using tylenol for pain,    When should I call my doctor?  If you have increased:     Pain or swelling    Pus or drainage (clear or slightly yellow drainage is ok)    Temperature over 100F    Spreading redness or warmth around wound    When will I hear about my results?  The biopsy results can take 2-3 weeks to come back. The clinic will call you with the results, send you a [employer] message, or have you schedule a follow-up clinic or phone time to discuss the results. Contact our clinics if you do not hear from us in 3 weeks.     Who should I call with questions?    [facility]: [phone]     [facility]: [phone]    For urgent needs outside of business hours call the [facility] at [phone] and ask for the dermatology resident on call  Wound Care After a Biopsy    What is a skin biopsy?  A skin biopsy allows the doctor to examine a very small piece of tissue under the microscope to determine the diagnosis and the best treatment for the skin condition. A local anesthetic (numbing medicine)  is injected with a very small needle into the skin area to be tested. A small piece of skin is taken from the area. Sometimes a suture (stitch) is used.     What are the risks of a skin biopsy?  I will experience scar, bleeding, swelling, pain, crusting and redness. I may experience incomplete removal or recurrence. Risks of this procedure are excessive bleeding, bruising, infection, nerve damage, numbness, thick (hypertrophic or keloidal) scar and non-diagnostic biopsy.    How should I care for my wound for the first 24 hours?    Keep the wound dry and covered for 24 hours    If it bleeds, hold direct pressure on the area for 15  minutes. If bleeding does not stop then go to the emergency room    Avoid strenuous exercise the first 1-2 days or as your doctor instructs you    How should I care for the wound after 24 hours?    After 24 hours, remove the bandage    You may bathe or shower as normal    If you had a scalp biopsy, you can shampoo as usual and can use shower water to clean the biopsy site daily    Clean the wound twice a day with gentle soap and water    Do not scrub, be gentle    Apply white petroleum/Vaseline after cleaning the wound with a cotton swab or a clean finger, and keep the site covered with a Bandaid /bandage. Bandages are not necessary with a scalp biopsy    If you are unable to cover the site with a Bandaid /bandage, re-apply ointment 2-3 times a day to keep the site moist. Moisture will help with healing    Avoid strenuous activity for first 1-2 days    Avoid lakes, rivers, pools, and oceans until the stitches are removed or the site is healed    How do I clean my wound?    Wash hands thoroughly with soap or use hand  before all wound care    Clean the wound with gentle soap and water    Apply white petroleum/Vaseline  to wound after it is clean    Replace the Bandaid /bandage to keep the wound covered for the first few days or as instructed by your doctor    If you had a scalp biopsy, warm shower water to the area on a daily basis should suffice    What should I use to clean my wound?     Cotton-tipped applicators (Qtips )    White petroleum jelly (Vaseline ). Use a clean new container and use Q-tips to apply.    Bandaids   as needed    Gentle soap     How should I care for my wound long term?    Do not get your wound dirty    Keep up with wound care for one week or until the area is healed.    A small scab will form and fall off by itself when the area is completely healed. The area will be red and will become pink in color as it heals. Sun protection is very important for how your scar will turn out.  Sunscreen with an SPF 30 or greater is recommended once the area is healed.      You should have some soreness but it should be mild and slowly go away over several days. Talk to your doctor about using tylenol for pain,    When should I call my doctor?  If you have increased:     Pain or swelling    Pus or drainage (clear or slightly yellow drainage is ok)    Temperature over 100F    Spreading redness or warmth around wound    When will I hear about my results?  The biopsy results can take 2-3 weeks to come back. The clinic will call you with the results, send you a [employer] message, or have you schedule a follow-up clinic or phone time to discuss the results. Contact our clinics if you do not hear from us in 3 weeks.     Who should I call with questions?    [facility]: [phone]     [facility]: [phone]    For urgent needs outside of business hours call the [facility] at [phone] and ask for the dermatology resident on call

## 2021-03-16 NOTE — NURSING NOTE
Vi Zayas's goals for this visit include:   Chief Complaint   Patient presents with     RECHECK     pt states that the rash is gone. Used the metrocream      Skin Check     She requests these members of her care team be copied on today's visit information:     PCP: Ce Villanueva    Referring Provider:  No referring provider defined for this encounter.    There were no vitals taken for this visit.    Do you need any medication refills at today's visit? Tahmina Smith LPN

## 2021-03-16 NOTE — LETTER
3/16/2021         RE: Vi Zayas  [address]  [place] [address]        Dear Colleague,    Thank you for referring your patient, Vi Zayas, to the [facility]. Please see a copy of my visit note below.    [facility] Dermatology Note  Encounter Date: Mar 16, 2021  Office Visit     Dermatology Problem List:  1. No personal or family history of skin cancer   2. Acne vulgaris with possible dermatitis  - Current tx: metrocream  3. NUB - left popliteal fossa, right neck, left abdomen  -bx 3/16/21      ____________________________________________    Assessment & Plan:    # Acne vulgaris with possible perioral dermatitis, likely flared with mask use - resolved.  - If returns then restartmetrocream BID    # Multiple clinically benign nevi on the face, neck and trunk.    - No further intervention needed.     # Neoplasm of uncertain behavior on the left popliteal fossa. The differential diagnosis includes nevus vs spitz nevus.    - See procedure note.     # Papule on the right neck. The differential diagnosis includes nevus vs other. Hx of catching   - See procedure note.    # Papule on the left abodmen. The differential diagnosis includes nevus vs other. Hx of catching.   - See procedure note.        Procedures Performed:   -Shave biopsy: Location(s) left popliteal fossa.  After discussion of benefits and risks including but not limited to bleeding/bruising, pain/swelling, infection, scar, incomplete removal, nerve damage/numbness, recurrence, and non-diagnostic biopsy, written consent, verbal consent and photographs were obtained. Time-out was performed. The area was cleaned with isopropyl alcohol. 0.5mL of 1% lidocaine with epinephrine was injected to obtain adequate anesthesia of each lesion. Shave biopsy was performed. Hemostasis was achieved with aluminium chloride. Vaseline and a sterile dressing were applied. The patient tolerated the procedure and no complications were  noted. The patient was provided with verbal and written post care instructions.     -Shave removal: Location(s) right neck, left abdomen- pt requesting removal for symptoms of catching.  After discussion of benefits and risks including but not limited to bleeding/bruising, pain/swelling, infection, scar, incomplete removal, nerve damage/numbness, recurrence, and non-diagnostic biopsy, written consent, verbal consent and photographs were obtained. Time-out was performed. The area was cleaned with isopropyl alcohol. 0.5mL of 1% lidocaine with epinephrine was injected to obtain adequate anesthesia of each lesion. Shave removal was performed. Hemostasis was achieved with aluminium chloride. Vaseline and a sterile dressing were applied. The patient tolerated the procedure and no complications were noted. The patient was provided with verbal and written post care instructions.     Pt tolerated the procedure well and did feel lightheaded but BP normal at 102/68. She was monitored prior to leaving by RN and given water and granola bar.     Follow-up: 1-2 year(s) in-person for a skin check, or earlier for new or changing lesions    Staff and Scribe:     Scribe Disclosure:   I, Ricardo Villanueva, am serving as a scribe to document services personally performed by this physician, Dr. Louisa Malone, based on data collection and the provider's statements to me.     Provider Disclosure:   The documentation recorded by the scribe accurately reflects the services I personally performed and the decisions made by me.    Louisa Malone MD    Department of Dermatology  [facility]: Phone: [phone], Fax:[phone]  [facility]: Phone: [phone], Fax: [phone]      ____________________________________________    CC: RECHECK (pt states that the rash is gone. Used the metrocream )    HPI:  Ms. Vi Zayas is  a(n) 30 year old female who presents today as a return patient for a skin check and acne.    Last seen 02/02/21 for acne with possible perioral dermatitis. She was started on metrocream BID, and OK to use Joceline lauder and [employer] products. Plan was to follow up in 6 weeks to reevaluate and complete a skin check.    Today, she notes her rash is gone. She has been using Metrocream. She would also like a skin check today. She has no personal or family history of skin cancer. She does not use tanning beds. She notes lesions on the right neck and left abdomen have been catching.    Patient is otherwise feeling well, without additional skin concerns. Patient is not pregnant or breast feeding.     Labs Reviewed:  N/A    Physical Exam:  Vitals: There were no vitals taken for this visit.  SKIN: Total skin excluding the undergarment areas was performed. The exam included the head/face, neck, both arms, chest, back, abdomen, both legs, digits and/or nails.   - Patient is wearing a bra. Declines genital exam.  - Face is clear  - Multiple regular brown pigmented macules and papules are identified on the face, neck and trunk.  - Left popliteal miles: 3 mm pigmented macule with a star burst pattern.  - Right neck: 5 mm pigmented papule  - Left abdomen: 3 mm pedunculated papule   - No other lesions of concern on areas examined.     Medications:  Current Outpatient Medications   Medication     metroNIDAZOLE (METROCREAM) 0.75 % external cream     norgestimate-ethinyl estradiol (ESTARYLLA) 0.25-35 MG-MCG tablet     No current facility-administered medications for this visit.       Past Medical History:   There is no problem list on file for this patient.    Past Medical History:   Diagnosis Date     NO ACTIVE PROBLEMS         CC No referring provider defined for this encounter. on close of this encounter.    The following medication was given:     MEDICATION:  Lidocaine with epinephrine 1% 1:[id number]  ROUTE: SQ  SITE: see procedure  note  DOSE: 2 cc  LOT #: -EV  : [employer]  EXPIRATION DATE: 7-1-2021  NDC#: 3782-5285-54   Was there drug waste? No  Multi-dose vial: Yes    Cara Smith LPN  March 16, 2021          Again, thank you for allowing me to participate in the care of your patient.        Sincerely,        Louisa Malone MD

## 2021-03-16 NOTE — LETTER
March 25, 2021      Vi Zayas  [address]  [place] [address]        Dear Vi Zayas,     We are writing to inform you of your test results that show a mildly atypical mole that needs a recheck on the leg in 1 year. No skin cancer was seen. The other 2 spots are normal moles. .     Thank you for taking the time to be seen in our dermatology clinic. If you have further questions or concerns, please contact the clinic(see phone number listed below).       Sincerely,     Louisa Malone MD      Department of Dermatology   [facility]: Phone: [phone], Fax:[phone]   [facility]: Phone: [phone], Fax: [phone]     Resulted Orders   Dermatological path order and indications   Result Value Ref Range    Copath Report       Patient Name: VI ZAYAS  MR#: [id number]  Specimen #: [id number]  Collected: 3/16/2021  Received: 3/17/2021  Reported: 3/21/2021 11:48  Ordering Phy(s): LOUISA MALONE    For improved result formatting, select 'View Enhanced Report Format' under   Linked Documents section.    SPECIMEN(S):  A: Skin, left popliteal fossa, shave  B: Skin, right neck, shave  C: Skin, left abdomen, shave    FINAL DIAGNOSIS:  A. Skin, left popliteal fossa, shave:  - Compound melanocytic nevus with mild atypia - (see description)    B. Skin, right neck, shave:  - Intradermal melanocytic nevus - (see description)    C. Skin, left abdomen, shave:  - Predominantly intradermal melanocytic nevus - (see description)    I have personally reviewed all specimens and/or slides, including the   listed special stains, and used them  with my medical judgement to determine or confirm the final diagnosis.    Electronically signed out by:    Gal Dumas M.D., [facility]    CLINICAL HISTORY:  The patient is a 30 year old  female.    GROSS:  A: The specimen is received in formalin with proper patient   identification, labeled  "\"left popliteal fossa\".  The specimen consists of a 0.3 x 0.2 cm tan-white skin shave biopsy with a   0.2 x 0.1 cm red-brown lesion,  margin inked blue. The specimen is bisected and submitted entirely in   cassette A1.    B: The specimen is received in formalin with proper patient   identification, labeled \"right neck\".  The  specimen consists of a 0.5 x 0.3 cm tan-brown skin shave biopsy, margin   inked blue. The specimen is bisected  and submitted entirely in cassette B1.    C: The specimen is received in formalin with proper patient   identification, labeled \"left abdomen\".  The  specimen consists of a 0.3 x 0.2 cm crusted skin shave biopsy, margin   inked blue. The specimen is bisected and  submitted entirely in cassette C 1. (Dictated by:  3/17/2021 08:37 AM)    MICROSCOPIC:  A. The specimen exhibits a compound melanocytic proliferation with a   narrow lateral diameter, whic h is  predominantly nested at the junction, with mild cytologic atypia and   architectural disorder, above small nests  of papillary dermal melanocytes which mature with descent.  A single   (non-atypical) dermal-appearing mitotic  figure is interpreted as connected to adnexal epithelium.  The lesion   extends to the lateral and deep margins.    B. The specimen exhibits an intradermal proliferation of nests and cords   of melanocytes which mature with  descent, without a significant junctional component.  The lesion extends   to the deep margin.    C. The specimen exhibits an intradermal proliferation of nests and cords   of melanocytes which mature with  descent, with only a minor junctional component.  The lesion extends to   the deep margin.    The technical component of this testing was completed at the [facility], with the professional component performed   at the [facility], [address] [place] [address],   [place], [place] " [phone] ([phone])    CPT Codes:  A: 62816-HQ6.P, 96695-JJ3.T  B: 24887-VJ8.P, 35792-IC9.T  C: 84142-AN1.P, 61499-HX2.T    COLLECTION SITE:  Client: [facility]  Location: [facility] (B)           If you have any questions or concerns, please call the clinic at the number listed above.       Sincerely,      Louisa Malone MD

## 2021-03-16 NOTE — PROGRESS NOTES
[facility] Dermatology Note  Encounter Date: Mar 16, 2021  Office Visit     Dermatology Problem List:  1. No personal or family history of skin cancer   2. Acne vulgaris with possible dermatitis  - Current tx: metrocream  3. NUB - left popliteal fossa, right neck, left abdomen  -bx 3/16/21      ____________________________________________    Assessment & Plan:    # Acne vulgaris with possible perioral dermatitis, likely flared with mask use - resolved.  - If returns then restartmetrocream BID    # Multiple clinically benign nevi on the face, neck and trunk.    - No further intervention needed.     # Neoplasm of uncertain behavior on the left popliteal fossa. The differential diagnosis includes nevus vs spitz nevus.    - See procedure note.     # Papule on the right neck. The differential diagnosis includes nevus vs other. Hx of catching   - See procedure note.    # Papule on the left abodmen. The differential diagnosis includes nevus vs other. Hx of catching.   - See procedure note.        Procedures Performed:   -Shave biopsy: Location(s) left popliteal fossa.  After discussion of benefits and risks including but not limited to bleeding/bruising, pain/swelling, infection, scar, incomplete removal, nerve damage/numbness, recurrence, and non-diagnostic biopsy, written consent, verbal consent and photographs were obtained. Time-out was performed. The area was cleaned with isopropyl alcohol. 0.5mL of 1% lidocaine with epinephrine was injected to obtain adequate anesthesia of each lesion. Shave biopsy was performed. Hemostasis was achieved with aluminium chloride. Vaseline and a sterile dressing were applied. The patient tolerated the procedure and no complications were noted. The patient was provided with verbal and written post care instructions.     -Shave removal: Location(s) right neck, left abdomen- pt requesting removal for symptoms of catching.  After discussion of benefits and risks  including but not limited to bleeding/bruising, pain/swelling, infection, scar, incomplete removal, nerve damage/numbness, recurrence, and non-diagnostic biopsy, written consent, verbal consent and photographs were obtained. Time-out was performed. The area was cleaned with isopropyl alcohol. 0.5mL of 1% lidocaine with epinephrine was injected to obtain adequate anesthesia of each lesion. Shave removal was performed. Hemostasis was achieved with aluminium chloride. Vaseline and a sterile dressing were applied. The patient tolerated the procedure and no complications were noted. The patient was provided with verbal and written post care instructions.     Pt tolerated the procedure well and did feel lightheaded but BP normal at 102/68. She was monitored prior to leaving by RN and given water and granola bar.     Follow-up: 1-2 year(s) in-person for a skin check, or earlier for new or changing lesions    Staff and Scribe:     Scribe Disclosure:   I, Ricardo Villanueva, am serving as a scribe to document services personally performed by this physician, Dr. Louisa Malone, based on data collection and the provider's statements to me.     Provider Disclosure:   The documentation recorded by the scribe accurately reflects the services I personally performed and the decisions made by me.    Louisa Malone MD    Department of Dermatology  [facility]: Phone: [phone], Fax:[phone]  [facility]: Phone: [phone], Fax: [phone]      ____________________________________________    CC: RECHECK (pt states that the rash is gone. Used the metrocream )    HPI:  Ms. Vi Zayas is a(n) 30 year old female who presents today as a return patient for a skin check and acne.    Last seen 02/02/21 for acne with possible perioral dermatitis. She was started on metrocream BID, and OK to use [employer] and  [employer] products. Plan was to follow up in 6 weeks to reevaluate and complete a skin check.    Today, she notes her rash is gone. She has been using Metrocream. She would also like a skin check today. She has no personal or family history of skin cancer. She does not use tanning beds. She notes lesions on the right neck and left abdomen have been catching.    Patient is otherwise feeling well, without additional skin concerns. Patient is not pregnant or breast feeding.     Labs Reviewed:  N/A    Physical Exam:  Vitals: There were no vitals taken for this visit.  SKIN: Total skin excluding the undergarment areas was performed. The exam included the head/face, neck, both arms, chest, back, abdomen, both legs, digits and/or nails.   - Patient is wearing a bra. Declines genital exam.  - Face is clear  - Multiple regular brown pigmented macules and papules are identified on the face, neck and trunk.  - Left popliteal miles: 3 mm pigmented macule with a star burst pattern.  - Right neck: 5 mm pigmented papule  - Left abdomen: 3 mm pedunculated papule   - No other lesions of concern on areas examined.     Medications:  Current Outpatient Medications   Medication     metroNIDAZOLE (METROCREAM) 0.75 % external cream     norgestimate-ethinyl estradiol (ESTARYLLA) 0.25-35 MG-MCG tablet     No current facility-administered medications for this visit.       Past Medical History:   There is no problem list on file for this patient.    Past Medical History:   Diagnosis Date     NO ACTIVE PROBLEMS         CC No referring provider defined for this encounter. on close of this encounter.

## 2021-03-21 LAB — COPATH REPORT: NORMAL

## 2021-07-07 ENCOUNTER — OFFICE VISIT (OUTPATIENT)
Dept: FAMILY MEDICINE | Facility: CLINIC | Age: 31
End: 2021-07-07
Payer: COMMERCIAL

## 2021-07-07 VITALS
HEART RATE: 92 BPM | WEIGHT: 118 LBS | BODY MASS INDEX: 18.76 KG/M2 | OXYGEN SATURATION: 99 % | DIASTOLIC BLOOD PRESSURE: 62 MMHG | TEMPERATURE: 98.2 F | SYSTOLIC BLOOD PRESSURE: 92 MMHG

## 2021-07-07 DIAGNOSIS — N30.01 ACUTE CYSTITIS WITH HEMATURIA: Primary | ICD-10-CM

## 2021-07-07 LAB
ALBUMIN UR-MCNC: >=300 MG/DL
AMORPH CRY #/AREA URNS HPF: ABNORMAL /HPF
APPEARANCE UR: ABNORMAL
BACTERIA #/AREA URNS HPF: ABNORMAL /HPF
BILIRUB UR QL STRIP: NEGATIVE
COLOR UR AUTO: YELLOW
GLUCOSE UR STRIP-MCNC: NEGATIVE MG/DL
HGB UR QL STRIP: ABNORMAL
KETONES UR STRIP-MCNC: NEGATIVE MG/DL
LEUKOCYTE ESTERASE UR QL STRIP: ABNORMAL
NITRATE UR QL: NEGATIVE
NON-SQ EPI CELLS #/AREA URNS LPF: ABNORMAL /LPF
PH UR STRIP: 6.5 PH (ref 5–7)
RBC #/AREA URNS AUTO: ABNORMAL /HPF
SOURCE: ABNORMAL
SP GR UR STRIP: 1.02 (ref 1–1.03)
TRANS CELLS #/AREA URNS HPF: ABNORMAL /HPF
UROBILINOGEN UR STRIP-ACNC: 0.2 EU/DL (ref 0.2–1)
WBC #/AREA URNS AUTO: >100 /HPF

## 2021-07-07 PROCEDURE — 99213 OFFICE O/P EST LOW 20 MIN: CPT | Performed by: NURSE PRACTITIONER

## 2021-07-07 PROCEDURE — 81001 URINALYSIS AUTO W/SCOPE: CPT | Performed by: NURSE PRACTITIONER

## 2021-07-07 PROCEDURE — 87186 SC STD MICRODIL/AGAR DIL: CPT | Performed by: NURSE PRACTITIONER

## 2021-07-07 PROCEDURE — 87088 URINE BACTERIA CULTURE: CPT | Performed by: NURSE PRACTITIONER

## 2021-07-07 PROCEDURE — 87086 URINE CULTURE/COLONY COUNT: CPT | Performed by: NURSE PRACTITIONER

## 2021-07-07 RX ORDER — NITROFURANTOIN 25; 75 MG/1; MG/1
100 CAPSULE ORAL 2 TIMES DAILY
Qty: 14 CAPSULE | Refills: 0 | Status: SHIPPED | OUTPATIENT
Start: 2021-07-07 | End: 2021-07-14

## 2021-07-07 ASSESSMENT — ENCOUNTER SYMPTOMS
DIFFICULTY URINATING: 0
FREQUENCY: 1
DYSURIA: 1
SHORTNESS OF BREATH: 0
FEVER: 0
CHILLS: 0
HEMATURIA: 1

## 2021-07-07 NOTE — PROGRESS NOTES
Assessment & Plan     1. Acute cystitis with hematuria  - *UA reflex to Microscopic and Culture ([place] and [facility] (except [place] and [place])  - Urine Microscopic  - Urine Culture Aerobic Bacterial  - nitroFURantoin macrocrystal-monohydrate (MACROBID) 100 MG capsule; Take 1 capsule (100 mg) by mouth 2 times daily for 7 days  Dispense: 14 capsule; Refill: 0      Return in about 1 week (around 7/14/2021) for worsening symptoms or failure to improve.    KY Ferrer CNP  [facility]    Emma Lebron is a 30 year old who presents for the following health issues     HPI     Genitourinary - Female  Onset/Duration: 2 days  Description:   Painful urination (Dysuria): YES           Frequency: YES  Blood in urine (Hematuria): YES  Delay in urine (Hesitency): no  Intensity: moderate  Progression of Symptoms:  same  Accompanying Signs & Symptoms:  Fever/chills: no  Flank pain: no  Nausea and vomiting: no  Vaginal symptoms: unsure  Abdominal/Pelvic Pain: pressure  History:   History of frequent UTI s: YES  History of kidney stones: no  Sexually Active: YES  Possibility of pregnancy: No  Precipitating or alleviating factors: None  Therapies tried and outcome: AZO         Review of Systems   Constitutional: Negative for chills and fever.   Respiratory: Negative for shortness of breath.    Cardiovascular: Negative for chest pain.   Genitourinary: Positive for dysuria, frequency, hematuria and urgency. Negative for difficulty urinating.            Objective    BP 92/62   Pulse 92   Temp 98.2  F (36.8  C) (Oral)   Wt 53.5 kg (118 lb)   SpO2 99%   BMI 18.76 kg/m    Body mass index is 18.76 kg/m .  Physical Exam  Vitals signs reviewed.   Constitutional:       Appearance: She is well-developed.   Cardiovascular:      Rate and Rhythm: Normal rate and regular rhythm.   Pulmonary:      Effort: Pulmonary effort is normal.      Breath sounds: Normal breath sounds.   Neurological:       Mental Status: She is alert and oriented to person, place, and time.   Psychiatric:         Mood and Affect: Mood normal.         Behavior: Behavior normal.            Results for orders placed or performed in visit on 07/07/21 (from the past 24 hour(s))   *UA reflex to Microscopic and Culture ([place] and [facility] (except [place] and [place])    Specimen: Midstream Urine   Result Value Ref Range    Color Urine Yellow     Appearance Urine Slightly Cloudy     Glucose Urine Negative NEG^Negative mg/dL    Bilirubin Urine Negative NEG^Negative    Ketones Urine Negative NEG^Negative mg/dL    Specific Gravity Urine 1.020 1.003 - 1.035    Blood Urine Moderate (A) NEG^Negative    pH Urine 6.5 5.0 - 7.0 pH    Protein Albumin Urine >=300 (A) NEG^Negative mg/dL    Urobilinogen Urine 0.2 0.2 - 1.0 EU/dL    Nitrite Urine Negative NEG^Negative    Leukocyte Esterase Urine Small (A) NEG^Negative    Source Midstream Urine    Urine Microscopic   Result Value Ref Range    WBC Urine >100 (A) OTO5^0 - 5 /HPF    RBC Urine 2-5 (A) OTO2^O - 2 /HPF    Squamous Epithelial /LPF Urine Moderate (A) FEW^Few /LPF    Transitional Epi Few FEW^Few /HPF    Bacteria Urine Few (A) NEG^Negative /HPF    Amorphous Crystals Few (A) NEG^Negative /HPF

## 2021-07-08 LAB
BACTERIA SPEC CULT: ABNORMAL
Lab: ABNORMAL
SPECIMEN SOURCE: ABNORMAL

## 2021-07-09 ENCOUNTER — TELEPHONE (OUTPATIENT)
Dept: FAMILY MEDICINE | Facility: CLINIC | Age: 31
End: 2021-07-09

## 2021-07-09 NOTE — TELEPHONE ENCOUNTER
America Ahmadi, APRN CNP  [facility]             Please call and let Vi know the urine culture does confirm she has a UTI and she is taking the appropriate antibiotic for treatment.

## 2021-07-09 NOTE — TELEPHONE ENCOUNTER
Patient/parent is informed of MD note below, as it is written. Verbalized good understanding.  Mary Moura RN

## 2021-07-23 DIAGNOSIS — Z30.41 ENCOUNTER FOR SURVEILLANCE OF CONTRACEPTIVE PILLS: ICD-10-CM

## 2021-07-23 RX ORDER — NORGESTIMATE AND ETHINYL ESTRADIOL 0.25-0.035
KIT ORAL
Qty: 84 TABLET | Refills: 0 | Status: SHIPPED | OUTPATIENT
Start: 2021-07-23 | End: 2021-10-15

## 2021-09-20 ENCOUNTER — OFFICE VISIT (OUTPATIENT)
Dept: FAMILY MEDICINE | Facility: CLINIC | Age: 31
End: 2021-09-20
Payer: COMMERCIAL

## 2021-09-20 VITALS
TEMPERATURE: 98.3 F | HEART RATE: 65 BPM | OXYGEN SATURATION: 98 % | BODY MASS INDEX: 19.25 KG/M2 | DIASTOLIC BLOOD PRESSURE: 71 MMHG | WEIGHT: 119.8 LBS | SYSTOLIC BLOOD PRESSURE: 106 MMHG | HEIGHT: 66 IN | RESPIRATION RATE: 14 BRPM

## 2021-09-20 DIAGNOSIS — R30.0 DYSURIA: ICD-10-CM

## 2021-09-20 DIAGNOSIS — R35.0 URINARY FREQUENCY: Primary | ICD-10-CM

## 2021-09-20 LAB
ALBUMIN UR-MCNC: NEGATIVE MG/DL
APPEARANCE UR: CLEAR
BILIRUB UR QL STRIP: NEGATIVE
CLUE CELLS: ABNORMAL
COLOR UR AUTO: YELLOW
GLUCOSE UR STRIP-MCNC: NEGATIVE MG/DL
HCG UR QL: NEGATIVE
HGB UR QL STRIP: ABNORMAL
KETONES UR STRIP-MCNC: NEGATIVE MG/DL
LEUKOCYTE ESTERASE UR QL STRIP: ABNORMAL
NITRATE UR QL: NEGATIVE
PH UR STRIP: 7 [PH] (ref 5–7)
RBC #/AREA URNS AUTO: NORMAL /HPF
SP GR UR STRIP: 1.01 (ref 1–1.03)
TRICHOMONAS, WET PREP: ABNORMAL
UROBILINOGEN UR STRIP-ACNC: 0.2 E.U./DL
WBC #/AREA URNS AUTO: NORMAL /HPF
WBC'S/HIGH POWER FIELD, WET PREP: ABNORMAL
YEAST, WET PREP: ABNORMAL

## 2021-09-20 PROCEDURE — 99213 OFFICE O/P EST LOW 20 MIN: CPT | Performed by: PHYSICIAN ASSISTANT

## 2021-09-20 PROCEDURE — 81025 URINE PREGNANCY TEST: CPT | Performed by: PHYSICIAN ASSISTANT

## 2021-09-20 PROCEDURE — 81001 URINALYSIS AUTO W/SCOPE: CPT | Performed by: PHYSICIAN ASSISTANT

## 2021-09-20 PROCEDURE — 87186 SC STD MICRODIL/AGAR DIL: CPT | Performed by: PHYSICIAN ASSISTANT

## 2021-09-20 PROCEDURE — 87086 URINE CULTURE/COLONY COUNT: CPT | Performed by: PHYSICIAN ASSISTANT

## 2021-09-20 PROCEDURE — 87210 SMEAR WET MOUNT SALINE/INK: CPT | Performed by: PHYSICIAN ASSISTANT

## 2021-09-20 PROCEDURE — 87088 URINE BACTERIA CULTURE: CPT | Performed by: PHYSICIAN ASSISTANT

## 2021-09-20 RX ORDER — NITROFURANTOIN 25; 75 MG/1; MG/1
100 CAPSULE ORAL 2 TIMES DAILY
Qty: 10 CAPSULE | Refills: 0 | Status: SHIPPED | OUTPATIENT
Start: 2021-09-20 | End: 2021-09-25

## 2021-09-20 ASSESSMENT — PAIN SCALES - GENERAL: PAINLEVEL: NO PAIN (0)

## 2021-09-20 ASSESSMENT — MIFFLIN-ST. JEOR: SCORE: 1289.29

## 2021-09-20 NOTE — PATIENT INSTRUCTIONS
Quinn Lebron,    Thank you for allowing [facility] to manage your care.    I am unsure of the cause of your symptoms, but your exam is reassuring. We will see what our workup shows.     If you develop worsening/changing symptoms at any time, please call 911 or go to the emergency department for evaluation.    I sent your prescriptions to your pharmacy.    Please allow 1-2 business days for our office to contact you in regards to your laboratory/radiological studies.  If not done so, I encourage you to login into [employer] ([email or web address] to review your results as well.     If you have any questions or concerns, please feel free to call us at [phone]    Sincerely,    Titi Moreno PA-C    Did you know?      You can schedule a video visit for follow-up appointments as well as future appointments for certain conditions.  Please see the below link.     [email or web address]    If you have not already done so,  I encourage you to sign up for [employer] ([email or web address]).  This will allow you to review your results, securely communicate with a provider, and schedule virtual visits as well.    We are continuing to schedule all people age 12 and older for COVID-19 vaccines (patients age 12-17 can only use the Pfizer vaccine).    After Tuesday, Aug. 24, we are offering third doses of the Pfizer and Moderna COVID-19 vaccines to moderately and severely immunocompromised patients. Qualified patients can schedule their third dose vaccine appointment via [employer] or by walking in to one of our retail pharmacies to receive the vaccine - no appointment needed. We will also be sending messages in [employer] or through the mail to patients that we have identified as immunocompromised per CDC criteria to allow them easy access to schedule their appointment.    We are not yet providing third shots of COVID-19 vaccine to patients who are not  immunocompromised. Please check back in the coming days for more information on when these may become available.       To schedule your 1st dose appointment, please log in to [employer] using this link to see when and where we have openings.     To schedule your additional dose appointment for immunocompromised patients, please log in to [employer] using this link to see when and where we have openings.    If you have technical difficulty using [employer], call [phone], option 1 for assistance.    More information about vaccine effectiveness at reducing spread of disease, hospitalizations, and death as well as vaccine safety and answers to other questions can be found on our website: [email or web address].         Patient Education     ??????? (dysuria)     ??????????? ?????????????? (???????) ????? ?????????? ?????????? ? ?????????????? ??????.   ??????? - ??? ????? ?? ??????????? ???? ?? ????? ??????????????. ?? ????? ????????? ??? ??????. ??????? ?????? ?? ???? ???????? ? ? ???, ??? ?? ????? ??????.   ??? ???????? ????????  ????????? ??????? ???????:    ????????????? ??? ???????? ????????, ????? ??? ???????? ????????????? ????? (???) ??? ????????, ???????????? ??????? ????? (????)    ???????????????? ??? ???????? ?? ?????????? ????????, ????? ??? ??, ??????? ?????????? ? ???????? ? ?????? ?????????    ???????? ? ????????? ??? ??????? ???????    ??????? ??????? ? ??????? ?????? ????  ??? ??????????????? ????????  ??? ???? ???????? ???. ?? ??? ??? ??????? ? ????? ????????? ? ????????. ????? ????????? ? ???? ? ???????????? ??????? ??? ??????? ???? ?????? ??????, ??? ???????? ???????? ???????. ??? ???????, ??? ??????????? ??????? ?????? ????. ????? ????? ????????? ????????? ???????? ????. ??? ????? ????????:     ?????? ??????? ???? (?????????? ??????),    ???????? ?? ?????????? ???????????? ??????? (?????????? ??????),    ?????? ?????????? ?????????? ???? ??? ??????????? (????????????????  ????????????).  ????????? ????? ??????? ???? ????? ???? ??????? ? ???????? ?????, ? ????????? ? ???????????. ??????? ???? ????? ????? ???? ???????? ?? ?????????? ???????? ? ??????? (????? ????). ??? ??????? ???? ????????? ??? ?????? ?? ???? ????????, ???? ??? ??????????.   ??? ??????? ????????  ??????? ??????? ?? ???????. ???? ? ??? ????????????? ????????, ??? ????? ???????????? ???????????. ??? ????? ???? ?????????, ??????????? ?????????????? ? ??????????? ????. ??? ??????? ???? ????? ?????????? ??? ?????? ? ????????? ???????. ???? ?? ??????, ???????? ????? ??????????.   ????? ?????????? ????????? ? ??????? ??????   ?????????? ????????? ????? ? ?????? ????????? ?????????:     ???????????  100,4  F ( 38 C) ? ???? ??? ?? ???????? ???????? ?????,    ?????????? ????????? ????? ??? ??? ????? ?????? ???????,    ???????? ? ??????????????? ??-?? ????,    ????? ??? ??????????? ????????? ?? ????????? ??? ???????? ?????,    ???? ??? ???? ? ????????,    ???????? ???? ? ????? ??? ??????,    ??????????? ? ??????????? ????????????? ???? (???????????) ? ??????? ???????.    1107-2939 [employer]. All rights reserved. This information is not intended as a substitute for professional medical care. Always follow your healthcare professional's instructions.           Patient Education     Dysuria  Dysuria is when you have pain during urination. It is often described as a burning feeling. Learn more about this problem and how it can be treated.     Painful urination (dysuria) is often caused by a problem in the urinary tract.   What causes dysuria?  Possible causes include:    Infection with a bacteria or virus such as a urinary tract infection (UTI) or a sexually transmitted infection (STI)    Sensitivity or allergy to chemicals such as those found in lotions and other products    Prostate or bladder problems    Radiation therapy to the pelvic area  How is dysuria diagnosed?  Your healthcare provider will examine you. He  or she will ask about your symptoms and health. After talking with you and doing a physical exam, your healthcare provider may know what is causing your dysuria. You will often have to give a urine sample. Tests of your urine (urinalysis) are done. A urinalysis may include:    Looking at the urine sample (visual exam)    Checking for substances (chemical exam)    Looking at a small amount of the urine under a microscope (microscopic exam)  Some parts of the urinalysis may be done in the provider's office and some in a lab. The urine sample may also be checked for bacteria and yeast (urine culture). Your healthcare provider will tell you more about these tests if they are needed.  How is dysuria treated?  Treatment depends on the cause. If you have a bacterial infection, you may need antibiotics. You may be given medicines to make it easier for you to urinate and help ease pain. Your healthcare provider can tell you more about your treatment options. If not treated, symptoms may get worse.  When to call your healthcare provider  Call the healthcare provider right away if you have any of the following:    Fever of  100.4  F ( 38 C) or higher, or as directed by your provider    No improvement after 3 days of treatment    Trouble urinating because of pain    New or increased discharge from the vagina or penis    Rash or joint pain    Increased back or belly pain    Enlarged painful lymph nodes (lumps) in the groin  [employer] last reviewed this educational content on 4/1/2019 2000-2021 [employer]. All rights reserved. This information is not intended as a substitute for professional medical care. Always follow your healthcare professional's instructions.

## 2021-09-20 NOTE — PROGRESS NOTES
Assessment & Plan   Problem List Items Addressed This Visit     None      Visit Diagnoses     Urinary frequency    -  Primary    Relevant Medications    nitroFURantoin macrocrystal-monohydrate (MACROBID) 100 MG capsule    cephALEXin (KEFLEX) 500 MG capsule    Other Relevant Orders    UA macro with reflex to Microscopic and Culture - Clinc Collect (Completed)    HCG Qual, Urine ([id number]) (Completed)    Wet prep - Clinic Collect (Completed)    Urine Microscopic (Completed)    Urine Culture Aerobic Bacterial - lab collect (Completed)    Dysuria        Relevant Medications    cephALEXin (KEFLEX) 500 MG capsule         Vi Zayas is a 31 year old female here with urinary urgency, frequency and dysuria.    VSS. Exam without any abdominal or CVAT. Wet prep not concerning. Pt is afebrile & has not been vomiting. UA is equivocal for UTI. UC sent. Initially treated with nitrofurantoin, but switched to cephalexin given UC showing Proteus. FU with PMD in 3 days for recheck as needed. If symptoms worsen, develop back pain/fevers/vomiting go to ER for further treatment. Discussed COVID vaccination.    Complete history and physical exam as below. AF with normal VS.    DDx and Dx discussed with and explained to the pt to their satisfaction.  All questions were answered at this time. Pt expressed understanding of and agreement with this dx, tx, and plan. No further workup warranted and standard medication warnings given. I have given the patient a list of pertinent indications for re-evaluation. Will go to the Emergency Department if symptoms worsen or new concerning symptoms arise. Patient left in no apparent distress.     See Patient Instructions    Return in about 1 week (around 9/27/2021) for a recheck of your symptoms if not improving, or call 911/go to an ER anytime if worsening.    JUSTICE Dowd  [facility] JAY Lebron is a 31 year old who presents for the following health issues  "  HPI     Genitourinary - Female  Onset/Duration: 2 days  Description:   Painful urination (Dysuria): no           Frequency: YES  Blood in urine (Hematuria): no  Delay in urine (Hesitency): no  Intensity: moderate  Progression of Symptoms:  worsening  Accompanying Signs & Symptoms:  Fever/chills: no  Flank pain: no  Nausea and vomiting: no  Vaginal symptoms: discharge  Abdominal/Pelvic Pain: no  History:   History of frequent UTI s: YES  History of kidney stones: maybe  Sexually Active: YES  Possibility of pregnancy: No  Precipitating or alleviating factors: None  Therapies tried and outcome:  drinking more water , Azo    LMP 9/12/21    Review of Systems   Constitutional, HEENT, cardiovascular, pulmonary, gi and gu systems are negative, except as otherwise noted.      Objective    /71   Pulse 65   Temp 98.3  F (36.8  C) (Tympanic)   Resp 14   Ht 1.683 m (5' 6.26\")   Wt 54.3 kg (119 lb 12.8 oz)   LMP 09/12/2021 (Exact Date)   SpO2 98%   BMI 19.18 kg/m    Body mass index is 19.18 kg/m .  Physical Exam  Vitals and nursing note reviewed.   Constitutional:       General: She is not in acute distress.     Appearance: She is not ill-appearing or diaphoretic.   HENT:      Head: Normocephalic and atraumatic.      Mouth/Throat:      Mouth: Mucous membranes are moist.   Eyes:      Conjunctiva/sclera: Conjunctivae normal.   Cardiovascular:      Rate and Rhythm: Normal rate and regular rhythm.      Heart sounds: Normal heart sounds. No murmur heard.   No friction rub. No gallop.    Pulmonary:      Effort: Pulmonary effort is normal. No respiratory distress.      Breath sounds: Normal breath sounds. No stridor. No wheezing, rhonchi or rales.   Abdominal:      General: Bowel sounds are normal. There is no distension.      Palpations: Abdomen is soft. There is no mass.      Tenderness: There is no abdominal tenderness. There is no right CVA tenderness, left CVA tenderness, guarding or rebound.      Hernia: No hernia " is present.   Skin:     General: Skin is warm and dry.   Neurological:      General: No focal deficit present.      Mental Status: She is alert. Mental status is at baseline.   Psychiatric:         Mood and Affect: Mood normal.         Behavior: Behavior normal.          Results for orders placed or performed in visit on 09/20/21   UA macro with reflex to Microscopic and Culture - Clinc Collect     Status: Abnormal    Specimen: Urine, Midstream   Result Value Ref Range    Color Urine Yellow Colorless, Straw, Light Yellow, Yellow    Appearance Urine Clear Clear    Glucose Urine Negative Negative mg/dL    Bilirubin Urine Negative Negative    Ketones Urine Negative Negative mg/dL    Specific Gravity Urine 1.015 1.003 - 1.035    Blood Urine Small (A) Negative    pH Urine 7.0 5.0 - 7.0    Protein Albumin Urine Negative Negative mg/dL    Urobilinogen Urine 0.2 0.2, 1.0 E.U./dL    Nitrite Urine Negative Negative    Leukocyte Esterase Urine Trace (A) Negative   HCG Qual, Urine ([id number])     Status: Normal   Result Value Ref Range    hCG Urine Qualitative Negative Negative   Urine Microscopic     Status: Normal   Result Value Ref Range    RBC Urine 0-2 0-2 /HPF /HPF    WBC Urine 0-5 0-5 /HPF /HPF    Narrative    Urine Culture not indicated   Wet prep - Clinic Collect     Status: Abnormal    Specimen: Vagina; Swab   Result Value Ref Range    Trichomonas Absent Absent    Yeast Absent Absent    Clue Cells Absent Absent    WBCs/high power field 1+ (A) None   Urine Culture Aerobic Bacterial - lab collect     Status: Abnormal (Preliminary result)    Specimen: Urine, Midstream   Result Value Ref Range    Culture Culture in progress     Culture >100,000 CFU/mL Proteus mirabilis (A)

## 2021-09-21 ENCOUNTER — TELEPHONE (OUTPATIENT)
Dept: FAMILY MEDICINE | Facility: CLINIC | Age: 31
End: 2021-09-21

## 2021-09-21 LAB — BACTERIA UR CULT: ABNORMAL

## 2021-09-21 RX ORDER — CEPHALEXIN 500 MG/1
500 CAPSULE ORAL 2 TIMES DAILY
Qty: 10 CAPSULE | Refills: 0 | Status: SHIPPED | OUTPATIENT
Start: 2021-09-21 | End: 2021-09-26

## 2021-09-21 NOTE — TELEPHONE ENCOUNTER
I spoke with patient re: UC results. Will switch to cephalexin for better coverage of Proteus spp. She will discontinue the nitrofurantoin. All questions and concerns addressed.    JUSTICE Dowd

## 2022-03-29 DIAGNOSIS — Z30.41 ENCOUNTER FOR SURVEILLANCE OF CONTRACEPTIVE PILLS: ICD-10-CM

## 2022-03-29 RX ORDER — NORGESTIMATE AND ETHINYL ESTRADIOL 0.25-0.035
KIT ORAL
Qty: 84 TABLET | Refills: 0 | Status: SHIPPED | OUTPATIENT
Start: 2022-03-29 | End: 2022-06-24

## 2022-03-29 NOTE — TELEPHONE ENCOUNTER
Routing refill request to provider for review/approval because:  Patient needs to be seen because it has been more than 1 year since last office visit.    Paola NEWSOMEN, RN

## 2022-03-29 NOTE — LETTER
March 29, 2022    Vi Zayas  [address]  [place] [address]    Dear Vi,       We recently received a refill request for ESTARYLLA 0.25-35 MG-MCG tablet.  We have refilled this for one time only because you are due for a:    Medication check office visit      Please call at your earliest convenience so that there will not be a delay with your future refills.          Thank you,   Your [facility] Team/  [phone]

## 2022-04-25 NOTE — PROGRESS NOTES
[facility] Dermatology Note  Encounter Date: Apr 26, 2022  Office Visit     Dermatology Problem List:  NEvus - submandibular region, left clavicle, and NUB right posterior shoulder bx 4/26/22  1. No personal or family history of skin cancer   2. Acne vulgaris with possible dermatitis  - Current tx: metrocream  3. Benign Biopsies   - Compound melanocytic nevus with mild atypia -  left popliteal fossa -bx 3/16/21  - Intradermal melanocytic nevus -  right neck -bx 3/16/21 - pt requested removal  - Predominantly intradermal melanocytic nevus -  left abdomen -bx 3/16/21- pt requested removal        ____________________________________________     Assessment & Plan:     # Acne vulgaris with possible perioral dermatitis, likely flared with mask use.    - Continue metrocream BID. Refilled.     # Nevus-  fleshy papule on the submandibular region. Patient notes it is very irritating and bleed.   - See procedure note.     # Nevus - fleshy papule on the left clavicle. Patient notes it is very irritating and bleed.   - See procedure note.     # Multiple clinically benign nevi.   - No further intervention needed.    # History of atypical nevus. No clinical evidence of recurrence.  - Recommend sunscreens SPF #30 or greater, protective clothing and avoidance of tanning beds.    # Neoplasm of uncertain behavior on the the right posterior shoulder. The differential diagnosis includes nevus vs early spitz vs other.   - See procedure note.       Procedures Performed:  - Shave biopsy procedure note, location(s):  right posterior shoulder.  After discussion of benefits and risks including but not limited to bleeding, infection, scar, incomplete removal, recurrence, and non-diagnostic biopsy, written consent and photographs were obtained. The area was cleaned with isopropyl alcohol. 0.5mL of 1% lidocaine with epinephrine was injected to obtain adequate anesthesia of lesion(s). Shave biopsy at site(s) performed.  Hemostasis was achieved with aluminium chloride. Petrolatum ointment and a sterile dressing were applied. The patient tolerated the procedure and no complications were noted. The patient was provided with verbal and written post care instructions.     - Shave removal procedure note, location(s): submandibular region, left clavicle.  After discussion of benefits and risks including but not limited to bleeding, infection, scar, incomplete removal, recurrence, and non-diagnostic biopsy, written consent and photographs were obtained. The area was cleaned with isopropyl alcohol. 0.5mL of 1% lidocaine with epinephrine was injected to obtain adequate anesthesia of lesion(s). Shave biopsy at site(s) performed. Hemostasis was achieved with aluminium chloride. Petrolatum ointment and a sterile dressing were applied. The patient tolerated the procedure and no complications were noted. The patient was provided with verbal and written post care instructions.     Follow-up: 1 year skin check and pending path    She refused an interpretor.   Staff and Scribe:     Scribe Disclosure:   Gage QUINTANA, am serving as a scribe to document services personally performed by this physician, Dr. Louisa Malone, based on data collection and the provider's statements to me.     Provider Disclosure:   The documentation recorded by the scribe accurately reflects the services I personally performed and the decisions made by me.    Louisa Malone MD    Department of Dermatology  [facility]: Phone: [phone], Fax:[phone]  [facility]: Phone: [phone], Fax: [phone]      ____________________________________________    CC: Skin Check (Areas of concern: chin no personal hx of skin cancer)    HPI:  Ms. Vi Zayas is a(n) 31 year old female who presents today as a return patient for a skin check.     Last seen on  3/16/21 for a skin check. At that time, a bx was performed on the  right neck, left abdomen, and left popliteal fossa.     Today, patient notes concern on the chin. Notes it is very irritating and bleed. Notes lesion on the left clavicle is painful.     Patient declined an  today.     Patient is otherwise feeling well, without additional skin concerns.    Labs Reviewed:  N/A    Physical Exam:  Vitals: There were no vitals taken for this visit.  SKIN: Total skin excluding the undergarment areas was performed. The exam included the head/face, neck, both arms, chest, back, abdomen, both legs, digits and/or nails. Declines genital exam  - 2 mm macule, globular distally on the right posterior shoulder.   -  fleshy papule on the submandibular region.  -   fleshy papule on the left clavicle.   - Faint erythematous papules on the right chin.   - Multiple regular brown pigmented macules and papules are identified on the face, trunk and extremities.  - No other lesions of concern on areas examined.     Medications:  Current Outpatient Medications   Medication     ESTARYLLA 0.25-35 MG-MCG tablet     No current facility-administered medications for this visit.      Past Medical History:   There is no problem list on file for this patient.    Past Medical History:   Diagnosis Date     NO ACTIVE PROBLEMS         CC No referring provider defined for this encounter. on close of this encounter.

## 2022-04-26 ENCOUNTER — OFFICE VISIT (OUTPATIENT)
Dept: DERMATOLOGY | Facility: CLINIC | Age: 32
End: 2022-04-26
Payer: COMMERCIAL

## 2022-04-26 DIAGNOSIS — D22.9 NEVUS: ICD-10-CM

## 2022-04-26 DIAGNOSIS — D22.9 MULTIPLE BENIGN NEVI: Primary | ICD-10-CM

## 2022-04-26 DIAGNOSIS — L70.0 ACNE VULGARIS: ICD-10-CM

## 2022-04-26 DIAGNOSIS — D48.5 NEOPLASM OF UNCERTAIN BEHAVIOR OF SKIN: ICD-10-CM

## 2022-04-26 PROCEDURE — 88305 TISSUE EXAM BY PATHOLOGIST: CPT | Performed by: DERMATOLOGY

## 2022-04-26 PROCEDURE — 11310 SHAVE SKIN LESION 0.5 CM/<: CPT | Performed by: DERMATOLOGY

## 2022-04-26 PROCEDURE — 99213 OFFICE O/P EST LOW 20 MIN: CPT | Mod: 25 | Performed by: DERMATOLOGY

## 2022-04-26 PROCEDURE — 11102 TANGNTL BX SKIN SINGLE LES: CPT | Mod: XS | Performed by: DERMATOLOGY

## 2022-04-26 PROCEDURE — 11300 SHAVE SKIN LESION 0.5 CM/<: CPT | Mod: XS | Performed by: DERMATOLOGY

## 2022-04-26 ASSESSMENT — PAIN SCALES - GENERAL: PAINLEVEL: NO PAIN (0)

## 2022-04-26 NOTE — NURSING NOTE
The following medication was given:     MEDICATION:  Lidocaine with epinephrine 1% [id number]  ROUTE: SQ  SITE: see procedure note  DOSE: 1.5cc  LOT #: [id number]  : Pb  EXPIRATION DATE: 12/2022  NDC#: 6703-0734-08  Was there drug waste? 0.5cc  Multi-dose vial: Yes    Krissy Chong LPN  April 26, 2022

## 2022-04-26 NOTE — PATIENT INSTRUCTIONS

## 2022-04-26 NOTE — NURSING NOTE
Vi Zayas's goals for this visit include:   Chief Complaint   Patient presents with     Skin Check     Areas of concern: chin no personal hx of skin cancer       She requests these members of her care team be copied on today's visit information:     PCP: Ce Villanueva    Referring Provider:  No referring provider defined for this encounter.    There were no vitals taken for this visit.    Do you need any medication refills at today's visit? Tahmina Chong LPN

## 2022-04-26 NOTE — LETTER
4/26/2022         RE: Vi Zayas  [address]  [place] [address]        Dear Colleague,    Thank you for referring your patient, Vi Zayas, to the [facility]. Please see a copy of my visit note below.    [facility] Dermatology Note  Encounter Date: Apr 26, 2022  Office Visit     Dermatology Problem List:  NEvus - submandibular region, left clavicle, and NUB right posterior shoulder bx 4/26/22  1. No personal or family history of skin cancer   2. Acne vulgaris with possible dermatitis  - Current tx: metrocream  3. Benign Biopsies   - Compound melanocytic nevus with mild atypia -  left popliteal fossa -bx 3/16/21  - Intradermal melanocytic nevus -  right neck -bx 3/16/21 - pt requested removal  - Predominantly intradermal melanocytic nevus -  left abdomen -bx 3/16/21- pt requested removal        ____________________________________________     Assessment & Plan:     # Acne vulgaris with possible perioral dermatitis, likely flared with mask use.    - Continue metrocream BID. Refilled.     # Nevus-  fleshy papule on the submandibular region. Patient notes it is very irritating and bleed.   - See procedure note.     # Nevus - fleshy papule on the left clavicle. Patient notes it is very irritating and bleed.   - See procedure note.     # Multiple clinically benign nevi.   - No further intervention needed.    # History of atypical nevus. No clinical evidence of recurrence.  - Recommend sunscreens SPF #30 or greater, protective clothing and avoidance of tanning beds.    # Neoplasm of uncertain behavior on the the right posterior shoulder. The differential diagnosis includes nevus vs early spitz vs other.   - See procedure note.       Procedures Performed:  - Shave biopsy procedure note, location(s):  right posterior shoulder.  After discussion of benefits and risks including but not limited to bleeding, infection, scar, incomplete removal, recurrence, and non-diagnostic  biopsy, written consent and photographs were obtained. The area was cleaned with isopropyl alcohol. 0.5mL of 1% lidocaine with epinephrine was injected to obtain adequate anesthesia of lesion(s). Shave biopsy at site(s) performed. Hemostasis was achieved with aluminium chloride. Petrolatum ointment and a sterile dressing were applied. The patient tolerated the procedure and no complications were noted. The patient was provided with verbal and written post care instructions.     - Shave removal procedure note, location(s): submandibular region, left clavicle.  After discussion of benefits and risks including but not limited to bleeding, infection, scar, incomplete removal, recurrence, and non-diagnostic biopsy, written consent and photographs were obtained. The area was cleaned with isopropyl alcohol. 0.5mL of 1% lidocaine with epinephrine was injected to obtain adequate anesthesia of lesion(s). Shave biopsy at site(s) performed. Hemostasis was achieved with aluminium chloride. Petrolatum ointment and a sterile dressing were applied. The patient tolerated the procedure and no complications were noted. The patient was provided with verbal and written post care instructions.     Follow-up: 1 year skin check and pending path    She refused an interpretor.   Staff and Scribe:     Scribe Disclosure:   Gage QUINTANA, am serving as a scribe to document services personally performed by this physician, Dr. Louisa Malone, based on data collection and the provider's statements to me.     Provider Disclosure:   The documentation recorded by the scribe accurately reflects the services I personally performed and the decisions made by me.    Louisa Malone MD    Department of Dermatology  [facility]: Phone: [phone], Fax:[phone]  [facility]: Phone: [phone], Fax:  [phone]      ____________________________________________    CC: Skin Check (Areas of concern: chin no personal hx of skin cancer)    HPI:  Ms. Vi Zayas is a(n) 31 year old female who presents today as a return patient for a skin check.     Last seen on 3/16/21 for a skin check. At that time, a bx was performed on the  right neck, left abdomen, and left popliteal fossa.     Today, patient notes concern on the chin. Notes it is very irritating and bleed. Notes lesion on the left clavicle is painful.     Patient declined an  today.     Patient is otherwise feeling well, without additional skin concerns.    Labs Reviewed:  N/A    Physical Exam:  Vitals: There were no vitals taken for this visit.  SKIN: Total skin excluding the undergarment areas was performed. The exam included the head/face, neck, both arms, chest, back, abdomen, both legs, digits and/or nails. Declines genital exam  - 2 mm macule, globular distally on the right posterior shoulder.   -  fleshy papule on the submandibular region.  -   fleshy papule on the left clavicle.   - Faint erythematous papules on the right chin.   - Multiple regular brown pigmented macules and papules are identified on the face, trunk and extremities.  - No other lesions of concern on areas examined.     Medications:  Current Outpatient Medications   Medication     ESTARYLLA 0.25-35 MG-MCG tablet     No current facility-administered medications for this visit.      Past Medical History:   There is no problem list on file for this patient.    Past Medical History:   Diagnosis Date     NO ACTIVE PROBLEMS         CC No referring provider defined for this encounter. on close of this encounter.      Again, thank you for allowing me to participate in the care of your patient.        Sincerely,        Louisa Malone MD

## 2022-04-28 LAB
PATH REPORT.COMMENTS IMP SPEC: NORMAL
PATH REPORT.COMMENTS IMP SPEC: NORMAL
PATH REPORT.FINAL DX SPEC: NORMAL
PATH REPORT.GROSS SPEC: NORMAL
PATH REPORT.MICROSCOPIC SPEC OTHER STN: NORMAL
PATH REPORT.RELEVANT HX SPEC: NORMAL

## 2022-05-21 ENCOUNTER — HEALTH MAINTENANCE LETTER (OUTPATIENT)
Age: 32
End: 2022-05-21

## 2022-06-19 DIAGNOSIS — Z30.41 ENCOUNTER FOR SURVEILLANCE OF CONTRACEPTIVE PILLS: ICD-10-CM

## 2022-06-22 RX ORDER — NORGESTIMATE AND ETHINYL ESTRADIOL 0.25-0.035
KIT ORAL
Qty: 84 TABLET | Refills: 0 | OUTPATIENT
Start: 2022-06-22

## 2022-06-22 NOTE — TELEPHONE ENCOUNTER
Routing refill request to provider for review/approval because:  Trinity given x1 and patient did not follow up, please advise  Health Maintenance Due   Topic Date Due     ANNUAL REVIEW OF HM ORDERS  Never done     ADVANCE CARE PLANNING  Never done     COVID-19 Vaccine (1) Never done     HEPATITIS C SCREENING  Never done     DTAP/TDAP/TD IMMUNIZATION (1 - Tdap) Never done     PREVENTIVE CARE VISIT  08/26/2021     Mary Moura RN

## 2022-06-23 NOTE — TELEPHONE ENCOUNTER
Sent [employer] message to schedule due to VM not set up.        Tracie MOULTON, [facility]

## 2022-06-24 ENCOUNTER — OFFICE VISIT (OUTPATIENT)
Dept: OBGYN | Facility: CLINIC | Age: 32
End: 2022-06-24
Payer: COMMERCIAL

## 2022-06-24 VITALS
DIASTOLIC BLOOD PRESSURE: 66 MMHG | WEIGHT: 120 LBS | HEART RATE: 81 BPM | BODY MASS INDEX: 19.29 KG/M2 | HEIGHT: 66 IN | SYSTOLIC BLOOD PRESSURE: 100 MMHG | OXYGEN SATURATION: 98 %

## 2022-06-24 DIAGNOSIS — Z30.41 ENCOUNTER FOR SURVEILLANCE OF CONTRACEPTIVE PILLS: ICD-10-CM

## 2022-06-24 PROCEDURE — 99212 OFFICE O/P EST SF 10 MIN: CPT | Performed by: NURSE PRACTITIONER

## 2022-06-24 RX ORDER — NORGESTIMATE AND ETHINYL ESTRADIOL 0.25-0.035
1 KIT ORAL DAILY
Qty: 84 TABLET | Refills: 3 | Status: SHIPPED | OUTPATIENT
Start: 2022-06-24 | End: 2023-04-26

## 2022-06-24 ASSESSMENT — PAIN SCALES - GENERAL: PAINLEVEL: NO PAIN (0)

## 2022-06-24 NOTE — PROGRESS NOTES
"  Assessment & Plan     Encounter for surveillance of contraceptive pills  Refill x 1 year. Plan preventive exam and pap smear in 1 year. Return to clinic sooner as needed.  - norgestimate-ethinyl estradiol (ESTARYLLA) 0.25-35 MG-MCG tablet; Take 1 tablet by mouth daily    KY Ziegler CNP  [facility] MELONY Lebron is a 31 year old, presenting for the following health issues:  Recheck Medication (Estarylla)      HPI     Medication Followup of Estarylla    Taking Medication as prescribed: yes    Side Effects:  Low libido    Medication Helping Symptoms:  yes    Doing well on current oral contraceptive pill. No trouble remembering to take it regularly. No adverse side effects. Cycles are regular and very light overall. Desires to continue on them. No contraindications to use of hormonal contraception. No additional concerns today, aware pap smear due next year.    Review of Systems   Constitutional, HEENT, cardiovascular, pulmonary, gi and gu systems are negative, except as otherwise noted.      Objective    /66 (BP Location: Right arm, Patient Position: Sitting, Cuff Size: Adult Regular)   Pulse 81   Ht 1.683 m (5' 6.26\")   Wt 54.4 kg (120 lb)   LMP 06/10/2022 (Approximate)   SpO2 98%   BMI 19.22 kg/m    Body mass index is 19.22 kg/m .  Physical Exam   GENERAL: healthy, alert and no distress  MS: no gross musculoskeletal defects noted, no edema  SKIN: no suspicious lesions or rashes  PSYCH: mentation appears normal, affect normal/bright        .  ..  "

## 2022-06-24 NOTE — PATIENT INSTRUCTIONS
If you have any questions regarding your visit, Please contact your care team.     [facility]: [phone]  To Schedule an Appointment 24/7  Call: [phone] TELEPHONE NUMBER     Lisa Zaragoza- APRN CNP      Rox Tierney-JERMAINE Mcdermott-RN  Jeimy Mcclendon-Surgery Scheduler  Ally-Surgery Scheduler         Monday 7:30 am-5:00 pm    Tuesday 8:00 am-4:00 pm    Wednesday 7:30 am-4:00 pm  [place]    Thursday 8:00 am-11:00 am    Friday 7:30 am-4:00 pm [address] [place] [address] [phone] ask for [facility]  [phone] Fax    Imaging Scheduling all locations  [phone]     [facility]  [address] [place] [address]   [place], [place] [id number] [phone]         Urgent Care locations:  [facility]   Monday-Friday  10 am - 8 pm  Saturday and Sunday   9 am - 5 pm     [phone]   If you need a medication refill, please contact your pharmacy. Please allow 3 business days for your refill to be completed.  As always, Thank you for trusting us with your healthcare needs!      see additional instructions from your care team below

## 2022-09-18 ENCOUNTER — HEALTH MAINTENANCE LETTER (OUTPATIENT)
Age: 32
End: 2022-09-18

## 2023-03-01 DIAGNOSIS — Z30.41 ENCOUNTER FOR SURVEILLANCE OF CONTRACEPTIVE PILLS: ICD-10-CM

## 2023-03-01 RX ORDER — NORGESTIMATE AND ETHINYL ESTRADIOL 0.25-0.035
KIT ORAL
OUTPATIENT
Start: 2023-03-01

## 2023-03-01 NOTE — TELEPHONE ENCOUNTER
Estarylla Last Written Prescription Date:  6/24/2022  Last Fill Quantity: 84,  # refills: 3   Last office visit: 6/24/2022 with prescribing provider:  Lisa for ocp mgmt     Too soon for refill. Routing to provider to close out request.    Mihaela Jara RN on 3/1/2023 at 2:14 PM

## 2023-04-21 ENCOUNTER — OFFICE VISIT (OUTPATIENT)
Dept: DERMATOLOGY | Facility: CLINIC | Age: 33
End: 2023-04-21
Payer: COMMERCIAL

## 2023-04-21 DIAGNOSIS — D22.9 MULTIPLE BENIGN NEVI: Primary | ICD-10-CM

## 2023-04-21 DIAGNOSIS — L70.0 ACNE VULGARIS: ICD-10-CM

## 2023-04-21 DIAGNOSIS — Z86.018 HISTORY OF DYSPLASTIC NEVUS: ICD-10-CM

## 2023-04-21 DIAGNOSIS — D48.5 NEOPLASM OF UNCERTAIN BEHAVIOR OF SKIN: ICD-10-CM

## 2023-04-21 PROCEDURE — 88305 TISSUE EXAM BY PATHOLOGIST: CPT | Performed by: PATHOLOGY

## 2023-04-21 PROCEDURE — 11102 TANGNTL BX SKIN SINGLE LES: CPT | Performed by: DERMATOLOGY

## 2023-04-21 PROCEDURE — 99213 OFFICE O/P EST LOW 20 MIN: CPT | Mod: 25 | Performed by: DERMATOLOGY

## 2023-04-21 PROCEDURE — 88342 IMHCHEM/IMCYTCHM 1ST ANTB: CPT | Performed by: PATHOLOGY

## 2023-04-21 PROCEDURE — 88341 IMHCHEM/IMCYTCHM EA ADD ANTB: CPT | Performed by: PATHOLOGY

## 2023-04-21 ASSESSMENT — PAIN SCALES - GENERAL: PAINLEVEL: NO PAIN (0)

## 2023-04-21 NOTE — NURSING NOTE
The following medication was given:     MEDICATION:  Lidocaine with epinephrine 1%  ROUTE: IL  SITE: see procedure note  DOSE: see procedure note  LOT #: [id number]  : [employer]  EXPIRATION DATE: 09/2024  NDC#: 79280-472-75    Was there drug waste? yes  Multi-dose vial: Yes    Cara Siddiqi CMA on 4/21/2023 at 4:36 PM

## 2023-04-21 NOTE — NURSING NOTE
Vi Zayas's goals for this visit include:   Chief Complaint   Patient presents with     Skin Check     FBSE. No area of concern.        She requests these members of her care team be copied on today's visit information:     PCP: [facility]    Referring Provider:  No referring provider defined for this encounter.    There were no vitals taken for this visit.    Do you need any medication refills at today's visit? Metronidazole 0.75%      Cara Siddiqi CMA on 4/21/2023 at 2:42 PM

## 2023-04-21 NOTE — PATIENT INSTRUCTIONS
Patient Education     Checking for Skin Cancer  You can find cancer early by checking your skin each month. There are 3 kinds of skin cancer. They are melanoma, basal cell carcinoma, and squamous cell carcinoma. Doing monthly skin checks is the best way to find new marks or skin changes. Follow the instructions below for checking your skin.   The ABCDEs of checking moles for melanoma   Check your moles or growths for signs of melanoma using ABCDE:   Asymmetry: the sides of the mole or growth don t match  Border: the edges are ragged, notched, or blurred  Color: the color within the mole or growth varies  Diameter: the mole or growth is larger than 6 mm (size of a pencil eraser)  Evolving: the size, shape, or color of the mole or growth is changing (evolving is not shown in the images below)    Checking for other types of skin cancer  Basal cell carcinoma or squamous cell carcinoma have symptoms such as:     A spot or mole that looks different from all other marks on your skin  Changes in how an area feels, such as itching, tenderness, or pain  Changes in the skin's surface, such as oozing, bleeding, or scaliness  A sore that does not heal  New swelling or redness beyond the border of a mole    Who s at risk?  Anyone can get skin cancer. But you are at greater risk if you have:   Fair skin, light-colored hair, or light-colored eyes  Many moles or abnormal moles on your skin  A history of sunburns from sunlight or tanning beds  A family history of skin cancer  A history of exposure to radiation or chemicals  A weakened immune system  If you have had skin cancer in the past, you are at risk for recurring skin cancer.   How to check your skin  Do your monthly skin checkups in front of a full-length mirror. Check all parts of your body, including your:   Head (ears, face, neck, and scalp)  Torso (front, back, and sides)  Arms (tops, undersides, upper, and lower armpits)  Hands (palms, backs, and fingers, including  under the nails)  Buttocks and genitals  Legs (front, back, and sides)  Feet (tops, soles, toes, including under the nails, and between toes)  If you have a lot of moles, take digital photos of them each month. Make sure to take photos both up close and from a distance. These can help you see if any moles change over time.   Most skin changes are not cancer. But if you see any changes in your skin, call your doctor right away. Only he or she can diagnose a problem. If you have skin cancer, seeing your doctor can be the first step toward getting the treatment that could save your life.   [employer] last reviewed this educational content on 4/1/2019 2000-2020 [employer]. [address] [place] [address], [place] [address]. All rights reserved. This information is not intended as a substitute for professional medical care. Always follow your healthcare professional's instructions.       When should I call my doctor?  If you are worsening or not improving, please, contact us or seek urgent care as noted below.     Who should I call with questions (adults)?  [facility] (adult and pediatric): [phone]  [facility] (adult): [phone]  For urgent needs outside of business hours call the [facility] at [phone] and ask for the dermatology resident on call to be paged  If this is a medical emergency and you are unable to reach an ER, Call [phone]    Who should I call with questions (pediatric)?  [facility]- Pediatric Dermatology  Dr. Darling Estrella, Dr. Eligio White, Dr. Ana Martins, JUSTICE López, Dr. Joan Munguia, Dr. Armida Adrian & Dr. Camilo Aguayo  Non-urgent nurse triage line; [phone]- Elise and No DEAN Care Coordinatorbryce Foster (/Complex ) [phone]    If you need a prescription refill, please contact your pharmacy. Refills are approved or denied by our  Physicians during normal business hours, Monday through Fridays  Per office policy, refills will not be granted if you have not been seen within the past year (or sooner depending on your child's condition)    Scheduling Information:  Pediatric Appointment Scheduling and Call Center [phone]  Radiology Scheduling- [phone]  Sedation Unit Scheduling- [phone]  [place] Scheduling- General [phone]; Pediatric Dermatology [phone]  Main  Services: [phone]  Croatian: [phone]  Liberian: [phone]  Hmong/Bahamian/Lithuanian: [phone]  [facility] Nursing Department Fax Number: [phone] (Fax all pre-operative paperwork to this number)    For urgent matters arising during evenings, weekends, or holidays that cannot wait for normal business hours please call [phone] and ask for the dermatology resident on call to be paged.     Wound Care After a Biopsy    What is a skin biopsy?  A skin biopsy allows the doctor to examine a very small piece of tissue under the microscope to determine the diagnosis and the best treatment for the skin condition. A local anesthetic (numbing medicine)  is injected with a very small needle into the skin area to be tested. A small piece of skin is taken from the area. Sometimes a suture (stitch) is used.     What are the risks of a skin biopsy?  I will experience scar, bleeding, swelling, pain, crusting and redness. I may experience incomplete removal or recurrence. Risks of this procedure are excessive bleeding, bruising, infection, nerve damage, numbness, thick (hypertrophic or keloidal) scar and non-diagnostic biopsy.    How should I care for my wound for the first 24 hours?  Keep the wound dry and covered for 24 hours  If it bleeds, hold direct pressure on the area for 15 minutes. If bleeding does not stop then go to the emergency room  Avoid strenuous exercise the first 1-2 days or as your doctor instructs you    How should I care for the  wound after 24 hours?  After 24 hours, remove the bandage  You may bathe or shower as normal  If you had a scalp biopsy, you can shampoo as usual and can use shower water to clean the biopsy site daily  Clean the wound twice a day with gentle soap and water  Do not scrub, be gentle  Apply white petroleum/Vaseline after cleaning the wound with a cotton swab or a clean finger, and keep the site covered with a Bandaid /bandage. Bandages are not necessary with a scalp biopsy  If you are unable to cover the site with a Bandaid /bandage, re-apply ointment 2-3 times a day to keep the site moist. Moisture will help with healing  Avoid strenuous activity for first 1-2 days  Avoid lakes, rivers, pools, and oceans until the stitches are removed or the site is healed    How do I clean my wound?  Wash hands thoroughly with soap or use hand  before all wound care  Clean the wound with gentle soap and water  Apply white petroleum/Vaseline  to wound after it is clean  Replace the Bandaid /bandage to keep the wound covered for the first few days or as instructed by your doctor  If you had a scalp biopsy, warm shower water to the area on a daily basis should suffice    What should I use to clean my wound?   Cotton-tipped applicators (Qtips )  White petroleum jelly (Vaseline ). Use a clean new container and use Q-tips to apply.  Bandaids   as needed  Gentle soap     How should I care for my wound long term?  Do not get your wound dirty  Keep up with wound care for one week or until the area is healed.  A small scab will form and fall off by itself when the area is completely healed. The area will be red and will become pink in color as it heals. Sun protection is very important for how your scar will turn out. Sunscreen with an SPF 30 or greater is recommended once the area is healed.  You should have some soreness but it should be mild and slowly go away over several days. Talk to your doctor about using tylenol for  pain,    When should I call my doctor?  If you have increased:   Pain or swelling  Pus or drainage (clear or slightly yellow drainage is ok)  Temperature over 100F  Spreading redness or warmth around wound    When will I hear about my results?  The biopsy results can take 2 weeks to come back.  Your results will automatically release to [facility] before your provider has even reviewed them.  The clinic will call you with the results, send you a [employer] message, or have you schedule a follow-up clinic or phone time to discuss the results.  Contact our clinics if you do not hear from us in 2 weeks.    Who should I call with questions?  [facility]: [phone]  [facility]: [phone]  For urgent needs outside of business hours call the [facility] at [phone] and ask for the dermatology resident on call

## 2023-04-21 NOTE — LETTER
4/21/2023         RE: Vi Zayas  [address]  [place] [address]        Dear Colleague,    Thank you for referring your patient, Vi Zayas, to the [facility]. Please see a copy of my visit note below.    [facility] Dermatology Note  Encounter Date: Apr 21, 2023  Office Visit     Dermatology Problem List:    1. 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 No personal or family history of skin cancer   3. Acne vulgaris with possible dermatitis  - Current tx: metrocream  4. Benign Biopsies   - Compound melanocytic nevus with mild atypia -  left popliteal fossa -bx 3/16/21  - Intradermal melanocytic nevus -  right neck -bx 3/16/21 - pt requested removal  - Predominantly intradermal melanocytic nevus -  left abdomen -bx 3/16/21- pt requested removal    Interpretor was on the phone.   ____________________________________________    Assessment & Plan:    # History ofdysplastic nevus  nevus. No clinical evidence of recurrence.  - Recommend sunscreens SPF #30 or greater, protective clothing and avoidance of tanning beds.   -ABCD's of melanoma were reviewed with patient and handout provided.   # Acne vulgaris with possible perioral dermatitis, likely flared with mask use.  now clear  - Continue metrocream BID. Refilled     # NUB right upper lateral back   - see procedure note below     #acne vulgaris, refill      Procedures Performed:   - Shave biopsy procedure note, location(s): right upper lateral back . After discussion of benefits and risks including but not limited to bleeding, infection, scar, incomplete removal, recurrence, and non-diagnostic biopsy verbal consent  and photographs were obtained. THe patient verbalized the risks of the procedure and consented.  The area was cleaned with isopropyl alcohol. 0.5mL of 1% lidocaine with epinephrine was injected to obtain adequate anesthesia of lesion(s). Shave biopsy at site(s)  performed. Hemostasis was achieved with aluminium chloride. Petrolatum ointment and a sterile dressing were applied. The patient tolerated the procedure and n o complications were noted. The patient was provided with verbal and written post care instructions.       Follow-up: 1.5 year(s) in-person, or earlier for new or changing lesions    Staff and Scribe:     Scribe Disclosure:   I, Krissy Arlette, am serving as a scribe to document services personally performed by Dr. Malone, based on data collection and the provider's statements to me.       Provider Disclosure:   The documentation recorded by the scribe accurately reflects the services I personally performed and the decisions made by me.    Louisa Malone MD    Department of Dermatology  [facility]: Phone: [phone], Fax:[phone]  [facility]: Phone: [phone], Fax: [phone]    ____________________________________________    CC: Skin Check (FBSE. No area of concern. )    HPI:  Ms. Vi Zayas is a(n) 32 year old female who presents today as a return patient for skin check    Today, patient reports no areas of concern.     Patient is otherwise feeling well, without additional skin concerns.    Labs Reviewed:  Biopsy 4/26/2022    Physical Exam:  Vitals: There were no vitals taken for this visit.  SKIN: Total skin excluding the undergarment areas was performed. The exam included the head/face, neck, both arms, chest, back, abdomen, both legs, digits and/or nails. Declines genital exam  - There is no erythema, telangectasias, nodularity, or pigmentation on the prior DN..   - nails are painted  - right upper lateral back 2mm pigmented macule  - No other lesions of concern on areas examined.     Medications:  Current Outpatient Medications   Medication     metroNIDAZOLE (METROCREAM) 0.75 % external cream     norgestimate-ethinyl  estradiol (ESTARYLLA) 0.25-35 MG-MCG tablet     No current facility-administered medications for this visit.      Past Medical History:   There is no problem list on file for this patient.    Past Medical History:   Diagnosis Date     NO ACTIVE PROBLEMS         CC No referring provider defined for this encounter. on close of this encounter.    Again, thank you for allowing me to participate in the care of your patient.        Sincerely,        Louisa Malone MD

## 2023-04-21 NOTE — PROGRESS NOTES
[facility] Dermatology Note  Encounter Date: Apr 21, 2023  Office Visit     Dermatology Problem List:    1. 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 No personal or family history of skin cancer   3. Acne vulgaris with possible dermatitis  - Current tx: metrocream  4. Benign Biopsies   - Compound melanocytic nevus with mild atypia -  left popliteal fossa -bx 3/16/21  - Intradermal melanocytic nevus -  right neck -bx 3/16/21 - pt requested removal  - Predominantly intradermal melanocytic nevus -  left abdomen -bx 3/16/21- pt requested removal    Interpretor was on the phone.   ____________________________________________    Assessment & Plan:    # History ofdysplastic nevus  nevus. No clinical evidence of recurrence.  - Recommend sunscreens SPF #30 or greater, protective clothing and avoidance of tanning beds.   -ABCD's of melanoma were reviewed with patient and handout provided.   # Acne vulgaris with possible perioral dermatitis, likely flared with mask use.  now clear  - Continue metrocream BID. Refilled     # NUB right upper lateral back   - see procedure note below     #acne vulgaris, refill      Procedures Performed:   - Shave biopsy procedure note, location(s): right upper lateral back . After discussion of benefits and risks including but not limited to bleeding, infection, scar, incomplete removal, recurrence, and non-diagnostic biopsy verbal consent  and photographs were obtained. THe patient verbalized the risks of the procedure and consented.  The area was cleaned with isopropyl alcohol. 0.5mL of 1% lidocaine with epinephrine was injected to obtain adequate anesthesia of lesion(s). Shave biopsy at site(s) performed. Hemostasis was achieved with aluminium chloride. Petrolatum ointment and a sterile dressing were applied. The patient tolerated the procedure and n o complications were noted. The patient was provided with verbal and written post care  instructions.       Follow-up: 1.5 year(s) in-person, or earlier for new or changing lesions    Staff and Scribe:     Scribe Disclosure:   I, Krissy Arlette, am serving as a scribe to document services personally performed by Dr. Malone, based on data collection and the provider's statements to me.       Provider Disclosure:   The documentation recorded by the scribe accurately reflects the services I personally performed and the decisions made by me.    Louisa Malone MD    Department of Dermatology  [facility]: Phone: [phone], Fax:[phone]  [facility]: Phone: [phone], Fax: [phone]    ____________________________________________    CC: Skin Check (FBSE. No area of concern. )    HPI:  Ms. Vi Zayas is a(n) 32 year old female who presents today as a return patient for skin check    Today, patient reports no areas of concern.     Patient is otherwise feeling well, without additional skin concerns.    Labs Reviewed:  Biopsy 4/26/2022    Physical Exam:  Vitals: There were no vitals taken for this visit.  SKIN: Total skin excluding the undergarment areas was performed. The exam included the head/face, neck, both arms, chest, back, abdomen, both legs, digits and/or nails. Declines genital exam  - There is no erythema, telangectasias, nodularity, or pigmentation on the prior DN..   - nails are painted  - right upper lateral back 2mm pigmented macule  - No other lesions of concern on areas examined.     Medications:  Current Outpatient Medications   Medication     metroNIDAZOLE (METROCREAM) 0.75 % external cream     norgestimate-ethinyl estradiol (ESTARYLLA) 0.25-35 MG-MCG tablet     No current facility-administered medications for this visit.      Past Medical History:   There is no problem list on file for this patient.    Past Medical History:   Diagnosis Date     NO ACTIVE  PROBLEMS         CC No referring provider defined for this encounter. on close of this encounter.

## 2023-04-26 DIAGNOSIS — Z30.41 ENCOUNTER FOR SURVEILLANCE OF CONTRACEPTIVE PILLS: ICD-10-CM

## 2023-04-26 LAB
PATH REPORT.COMMENTS IMP SPEC: NORMAL
PATH REPORT.FINAL DX SPEC: NORMAL
PATH REPORT.GROSS SPEC: NORMAL
PATH REPORT.MICROSCOPIC SPEC OTHER STN: NORMAL
PATH REPORT.RELEVANT HX SPEC: NORMAL

## 2023-04-26 RX ORDER — NORGESTIMATE AND ETHINYL ESTRADIOL 0.25-0.035
KIT ORAL
Qty: 28 TABLET | Refills: 0 | Status: SHIPPED | OUTPATIENT
Start: 2023-04-26 | End: 2023-05-02

## 2023-04-26 NOTE — TELEPHONE ENCOUNTER
Appointment scheduled for next week, 1 pack sent in case it is needed prior to appointment. Lisa MCPHERSON CNP

## 2023-04-26 NOTE — TELEPHONE ENCOUNTER
Last Written Prescription Date:  6/24/2022  Last Fill Quantity: 84,  # refills: 3   Last office visit: 6/24/2022 with Lisa for OCP mgmt  Future Office Visit:      Too soon for refills. Sent mychart reminder to pt to schedule her annual visit.    Routing to provider to close out request.    Mihaela Jara RN on 4/26/2023 at 1:35 PM

## 2023-05-02 NOTE — PROGRESS NOTES
"  Assessment & Plan     Encounter for surveillance of contraceptive pills  Refill x 1 year  - norgestimate-ethinyl estradiol (ESTARYLLA) 0.25-35 MG-MCG tablet; Take 1 tablet by mouth daily    Screening for malignant neoplasm of cervix  - Pap imaged thin layer screen with HPV - recommended age 30 - 65    KY Ziegler CNP  [facility] MELONY Lebron is a 32 year old, presenting for the following health issues:  Recheck Medication (Estarylla)    HPI     Medication Followup of Estarylla    Taking Medication as prescribed: yes    Side Effects:  None    Medication Helping Symptoms:  yes    Presents today for medication refill. Has been doing well on this oral contraceptive pill, no adverse side effects, no difficulty remembering to take the pills regularly. Cycles are regular and predictable. Desires to continue. She is due for a pap smear in a few months and amenable to completing this today. No other concerns.     Review of Systems   Constitutional, HEENT, cardiovascular, pulmonary, gi and gu systems are negative, except as otherwise noted.      Objective    /72 (BP Location: Right arm, Patient Position: Sitting, Cuff Size: Adult Regular)   Pulse 73   Ht 1.683 m (5' 6.26\")   Wt 57.2 kg (126 lb 3.2 oz)   LMP 04/19/2023 (Exact Date)   SpO2 97%   BMI 20.21 kg/m    Body mass index is 20.21 kg/m .  Physical Exam   GENERAL: healthy, alert and no distress   (female): normal female external genitalia, normal urethral meatus , vaginal mucosa pink, moist, well rugated and normal appearing cervix.  MS: no gross musculoskeletal defects noted, no edema  SKIN: no suspicious lesions or rashes  PSYCH: mentation appears normal, affect normal/bright    "

## 2023-05-03 ENCOUNTER — OFFICE VISIT (OUTPATIENT)
Dept: OBGYN | Facility: CLINIC | Age: 33
End: 2023-05-03
Payer: COMMERCIAL

## 2023-05-03 VITALS
HEIGHT: 66 IN | WEIGHT: 126.2 LBS | SYSTOLIC BLOOD PRESSURE: 111 MMHG | DIASTOLIC BLOOD PRESSURE: 72 MMHG | OXYGEN SATURATION: 97 % | HEART RATE: 73 BPM | BODY MASS INDEX: 20.28 KG/M2

## 2023-05-03 DIAGNOSIS — Z30.41 ENCOUNTER FOR SURVEILLANCE OF CONTRACEPTIVE PILLS: ICD-10-CM

## 2023-05-03 DIAGNOSIS — Z12.4 SCREENING FOR MALIGNANT NEOPLASM OF CERVIX: Primary | ICD-10-CM

## 2023-05-03 PROCEDURE — 99213 OFFICE O/P EST LOW 20 MIN: CPT | Performed by: NURSE PRACTITIONER

## 2023-05-03 PROCEDURE — G0145 SCR C/V CYTO,THINLAYER,RESCR: HCPCS | Performed by: NURSE PRACTITIONER

## 2023-05-03 PROCEDURE — 87624 HPV HI-RISK TYP POOLED RSLT: CPT | Performed by: NURSE PRACTITIONER

## 2023-05-03 RX ORDER — NORGESTIMATE AND ETHINYL ESTRADIOL 0.25-0.035
1 KIT ORAL DAILY
Qty: 84 TABLET | Refills: 3 | Status: SHIPPED | OUTPATIENT
Start: 2023-05-03 | End: 2024-05-03

## 2023-05-03 NOTE — PROGRESS NOTES
"  Confirmed Early Pregnancy Loss - Outpatient Visit    Subjective:  Vi Zayas is a 32 year old female coming today for management of likely early pregnancy loss. She has noticed the following:  - loss of pregnancy symptoms  - lower abdominal pain  - vaginal spotting/bleeding ***  Patient diagnosed with early pregnancy loss at the following time/location: ***.   {note that patient may not be aware of diagnosis if results haven't been discussed over the phone or emergency department. Be mindful of this and ask patient what their understanding is of what is going on with the pregnancy}    ROS: All other review of symptoms was otherwise negative except as noted in HPI    Dating:  LMP: No LMP recorded.  Ultrasound results:   {obgyn ultrasound probe type:367038::\"vaginal\"}  Intrauterine gestational sac seen: {yes/no:771298}  Gestational sac summary: {gestational sac summary:473757}  Fetal/Embryonic cardiac activity: {gen present/absent:381327}  Crown-rump length: {obgyn bpd:452810}  Adnexa: {adnexa findings ultrasound:695204}  ***    {As applicable, this patient meets this diagnostic criteria for early pregnancy loss per Doubliet (NEJ 2013):  Crown-rump length of >/7mm and no heartbeat;   Mean sac diameter of >/25mm and no embryo;   Absence of embryo with heartbeat >/2wks after a scan that showed a gestational sac without a yolk sac;   Absence of embryo with heartbeat >/11 days after a scan that showed a gestational sac with a yolk sac.}    Serum beta hCG: {INCREASED DECREASED:523411} which {IS:452624} congruent with early pregnancy loss.   Hemoglobin: ***  RH Status (if >8wks GA): ***    OB Hx:  OB History    Para Term  AB Living   3 2 1 1 1 2   SAB IAB Ectopic Multiple Live Births   0 0 0 0 2      # Outcome Date GA Lbr Matthias/2nd Weight Sex Delivery Anes PTL Lv   3 AB 2018           2   35w0d  2.12 kg (4 lb 10.8 oz) M    AKIL   1 Term  38w0d  2.72 kg (5 lb 15.9 oz) F    AKIL " "      Objective:  There were no vitals taken for this visit.  {[facility] Vitals:541362::\"Vitals were reviewed and were normal\"}    Gen: well-appearing, cooperative, well-groomed  Abd: ***  : cervical os {IS:456631} open, blood {IS:758382} present in vaginal canal, and blood/POC {Actions; is/are/not:589691} present in cervical os; Bimanual exam shows unilateral tenderness {PRESENT/ABSENT:398185::\"present\"} and ~ {NUMBERS 1-20:693395::\"6\"}-week uterus    Assessment and Plan:  First-trimester pregnancy loss - this based on [facility] criteria {AND/OR:402154} serum beta-hCG levels.    We reviewed the patient's options at length including the risks and benefits or expectant management, medical management using misoprostol alone, mifepristone and misoprostol, and aspiration procedure with local anesthesia and suction evacuation. Patient {IS:729607} vitally stable and {IS:283545} appropriate to manage as an outpatient.    The patient has elected and is appropriate for medical management with misoprostol {WITH-WITHOUT:909260} mifepristone.  {contraindication to medication management: unstable medical conditions, vitally unstable, hemoglobin < 9, intrauterine device in place, ectopic pregnancy or undiagnosed adnexal mass, hemorrhagic disorder, porphyrias or concurrent anticoagulant therapy;  contraindications to misoprostol: drug allergy to misoprostol or other prostaglandins;   contraindications to mifepristone: drug allergy to misoprostol or other prostaglandins, chronic adrenal failure, and concurrent long-term corticosteroid therapy.}    Medication Management  - confirmed patient is within gestational age limit (11w6d or less)  - obtain serum hCG (if applicable)  - obtain hemoglobin and type and screen (if patent is >8wk GA and if not already known, or if not performed within the window of onset of bleeding or diagnosis of EPL)  - reviewed expected and serious side effects from mifepristone  - reviewed how " to take misoprostol and expected side effects  - reviewed that mifepristone/misoprostol may not be effective and patient may require surgical management of miscarriage.   - confirmed and addressed any drug-drug interactions with patient's concurrent medicines, including vitamins and herbal supplements  - provided prescription of ibuprofen for cramping, loperamide for diarrhea and a medicine for nausea  - RHOGAM administered (if Rh negative and >8 wks GA)  - call or return to clinic in 1-2 weeks if no bleeding or obvious passage of POC's has occurred  - chart routed to RN team who will follow up with patient via telephone in 1-2 days (or next business day) after clinic visit to assess patient      KY Ziegler CNP

## 2023-05-03 NOTE — PATIENT INSTRUCTIONS
If you have any questions regarding your visit, Please contact your care team.     [facility] [facility]: [phone]  To Schedule an Appointment 24/7  Call: [phone] HOURS TELEPHONE NUMBER     Lisa Zaragoza- APRN CNP      Rox Shepherd-Surgery Scheduler  Ally-Surgery Scheduler         Monday 7:30 am-5:00 pm    Tuesday 8:00 am-4:00 pm    Wednesday 7:30 am-4:00 pm    Thursday 8:00 am-11:00 am    Friday 7:30 am-4:00 pm [address] [address] [place] [address] [phone] ask for [facility]  [phone] Fax    Imaging Scheduling all locations  [phone]    [facility]  [address] [place] [address] Dr.  [place], [place] [id number] [phone]         Urgent Care locations:  [facility]   Monday-Friday  10 am - 8 pm  Saturday and Sunday   9 am - 5 pm     [phone]   If you need a medication refill, please contact your pharmacy. Please allow 3 business days for your refill to be completed.  As always, Thank you for trusting us with your healthcare needs!      see additional instructions from your care team below

## 2023-05-08 LAB
BKR LAB AP GYN ADEQUACY: NORMAL
BKR LAB AP GYN INTERPRETATION: NORMAL
BKR LAB AP HPV REFLEX: NORMAL
BKR LAB AP LMP: NORMAL
BKR LAB AP PREVIOUS ABNORMAL: NORMAL
PATH REPORT.COMMENTS IMP SPEC: NORMAL
PATH REPORT.COMMENTS IMP SPEC: NORMAL
PATH REPORT.RELEVANT HX SPEC: NORMAL

## 2023-05-09 LAB
HUMAN PAPILLOMA VIRUS 16 DNA: NEGATIVE
HUMAN PAPILLOMA VIRUS 18 DNA: NEGATIVE
HUMAN PAPILLOMA VIRUS FINAL DIAGNOSIS: NORMAL
HUMAN PAPILLOMA VIRUS OTHER HR: NEGATIVE

## 2023-05-11 ENCOUNTER — TELEPHONE (OUTPATIENT)
Dept: DERMATOLOGY | Facility: CLINIC | Age: 33
End: 2023-05-11
Payer: COMMERCIAL

## 2023-05-11 NOTE — TELEPHONE ENCOUNTER
Excision/Mohs previsit information                                                    Diagnosis: Junctional dysplastic nevus with moderate to severe atypia  Site(s): right upper lateral back    Over the counter Chlorhexidine surgical soap to wash all skin below the belly button twice before surgery should be recommended for the following:  - Surgical sites below the waist  - Immunosuppressed  - Previous surgical site infection  - Anticipated wound care challenges    Medication & Allergy Information                                                      Review and update allergy and medication list.    Do you take the following medications:    Coumadin, Eliquis, Pradaxa, Xarelto:  NO   -If on Coumadin, INR should be checked within 7 days of surgery.  Range should be 3.5 or less or within therapeutic range.    Past Medical History                                                    Do you currently or have you previously had any of the following conditions:      Hepatitis:  NO    HIV/AIDS:  NO    Prolonged bleeding or bleeding disorder:  NO    Pacemaker or Defibrillator:  NO.      History of artificial or heart valve replacement:  NO    Endocarditis (inflammation of the inner lining of the heart's chambers and valves):  NO    Have you ever had a prosthetic joint infection:  NO    Pregnant or Breastfeeding:  NO    Mobility device (wheelchair, transfer difficulty): NO    Important Reminders:                                                        Ok to take all of their medications as prescribed    Patients can eat, no need to be fasting    Patient will not be able to get the site wet for 48 hrs    No submerging wound in standing water (lake, pool, bathtub, hot tub) for 2 weeks    No physical activity for 48 hrs (further restrictions will be discussed by MD at time of visit)    If any positives, send to RN for further review  Penny Eric RN

## 2023-06-04 ENCOUNTER — HEALTH MAINTENANCE LETTER (OUTPATIENT)
Age: 33
End: 2023-06-04

## 2023-07-24 ENCOUNTER — OFFICE VISIT (OUTPATIENT)
Dept: DERMATOLOGY | Facility: CLINIC | Age: 33
End: 2023-07-24
Payer: COMMERCIAL

## 2023-07-24 DIAGNOSIS — D22.5 ATYPICAL NEVUS OF BACK: ICD-10-CM

## 2023-07-24 PROCEDURE — 88305 TISSUE EXAM BY PATHOLOGIST: CPT | Performed by: DERMATOLOGY

## 2023-07-24 PROCEDURE — 12032 INTMD RPR S/A/T/EXT 2.6-7.5: CPT | Mod: GC | Performed by: DERMATOLOGY

## 2023-07-24 PROCEDURE — 11402 EXC TR-EXT B9+MARG 1.1-2 CM: CPT | Mod: GC | Performed by: DERMATOLOGY

## 2023-07-24 NOTE — PATIENT INSTRUCTIONS
Excision Wound Care Instructions with Steri strips      I will experience scar, altered skin color, bleeding, swelling, pain, crusting and redness. I may experience altered sensation. Risks are excessive bleeding, infection, muscle weakness, thick (hypertrophic or keloidal) scar, and recurrence. A second procedure may be recommended to obtain the best cosmetic or functional result.    Possible complications of any surgical procedure are bleeding, infection, scarring, alteration in skin color and sensation, muscle weakness in the area, wound dehiscence or seperation, or recurrence of the lesion or disease. On occasion, after healing, a secondary procedure or revision may be recommended in order to obtain the best cosmetic or functional result.       After your surgery, a pressure bandage will be placed over the area that has sutures. This will help prevent bleeding. Please, follow these instructions as they will help you to prevent complications as your wound heals.        For the First 48 hours:    Leave the pressure bandage on and keep it dry. If it should come loose, you may retape it, but do not take it off.    Relax and take it easy. Do not do any vigorous exercise, heavy lifting, or bending forward. This could cause the wound to bleed.    Post-operative pain is usually mild. You may take plain or extra strength Tylenol every 4 hours as needed (do not take more than 4,000mg in one day) if you have no liver problems and your doctor says it is okay. Do not take any medicine that contains aspirin, ibuprofen or motrin unless you have been recommended these by a doctor.  Avoid alcohol and vitamin E as these may increase your tendency to bleed.    You may put an ice pack around the bandaged area for 20 minutes every 2-3 hours. This may help reduce swelling, bruising, and pain. Make sure the ice pack is waterproof so that the pressure bandage does not get wet.        48 Hours After Surgery     Carefully remove the  outer white bandage down to clear plastic film.  You will notice dark brown, dried blood under the clear plastic film.  That is normal.  Leave in place for 2 weeks then remove, wash the area and treat like normal, healthy skin    2.  If you notice water under the clear plastic film or if the edges have rolled up, please remove and follow daily wound care as below for a total of 2 weeks.    Daily Wound Care:    Once the steri strips fall off wash wound with a mild soap and water.  Use caution when washing the wound, be gentle and do not let the forceful shower stream hit the wound directly. DO NOT WASH WITH HYDROGEN PEROXIDE FOR THIS MIGHT CAUSE THE SUTURES TO DISSOLVE FASTER THAN WHAT WE WANT.     PAT DRY.    Once steri strips fall off apply Vaseline (from a new container or tube) over the suture line with a Q-tip until it is completely healed. It is very important to keep the wound continuously moist, as wounds heal best in a moist environment.    Keep the site covered until site is healed, you can cover it with a Telfa (non-stick) dressing and tape or a band-aid. While your steri strips are on you can use them as your bandage.    Once steri strips fall off if you are unable to keep wound covered, you must apply Vaseline every 2 - 3 hours (while awake) to ensure it is being kept moist for optimal healing until completely healed. A dressing overnight is recommended to keep the area moist.        Call Us If:    You have pain that is not controlled with Tylenol/Ibuprofen.    You have signs or symptoms of an infection, such as: fever over 100 degrees F, redness, warmth, or foul-smelling or yellow/creamy drainage from the wound.        Who should I call with questions?  [facility]: [phone]   [facility]: [phone]  For urgent needs outside of business hours call the [facility] at [phone] and ask for the dermatology resident on call

## 2023-07-24 NOTE — NURSING NOTE
The following medication was given:     MEDICATION:  Lidocaine with epinephrine 1% 1:[id number]  ROUTE: SQ  SITE: see procedure note  DOSE: 11ml  LOT #: [id number]  : [employer]  EXPIRATION DATE: 1/31/2025  NDC#: 45458-419-34  Was there drug waste? no  Multi-dose vial: Yes    Dalila Ashby  July 24, 2023         Mastisol, Steri-Strips, Tegaderm and pressure dressing applied to excision site on R upper Lateral back.  Wound care instructions reviewed with patient and AVS provided.  Patient verbalized understanding.  Patient will follow up for suture removal: N/A.  No further questions or concerns at this time.

## 2023-07-24 NOTE — PROGRESS NOTES
CHIEF COMPLAINT: Dysplastic nevus.    SUBJECTIVE: Vi Zayas is a 32 year old female who presents for excision of a Dysplastic nevus.      OBJECTIVE: On examination, there were ***    ASSESSMENT AND PLAN:   1. ***  DERMATOLOGIC SURGERY REPORT   NAME OF PROCEDURE: EXCISION COMPLEX LAYERED LINEAR CLOSURE   Surgeon:   Resident:   PRE-OPERATIVE DIAGNOSIS:  Dysplastic nevus  POST-OPERATIVE DIAGNOSIS: Same   FINAL EXCISION SIZE: ***cm x *** cm, with *** mm margin   FINAL REPAIR LENGTH: *** cm   INDICATIONS: This patient presented with a ***cm x ***cm Dysplastic nevus of the Right lateral upper back. Excision was indicated. We discussed the principles of treatment and most likely complications including bleeding, infection, wound dehiscence, pain, nerve damage, and scarring. Informed consent was obtained and the patient underwent the procedure as follows.     PROCEDURE: The patient was taken to the operative suite. The treatment area was anesthetized with 1% lidocaine and epinephrine mixed 1:2 with 0.5% Marcaine. The area was washed with Hibiclens, rinsed with saline and draped with sterile towels. The lesion was delineated and excised down to fascia and tendon. Hemostasis was obtained by electrocoagulation.     REPAIR: A complex layered linear closure was selected as the procedure which would maximally preserve both function and cosmesis and for the following reasons: 1) the defect was widely undermined to 5 cm x 5 cm; 2) multiple deep plication and layered sutures placed; 3) wound size, depth, tension, and location.   After the excision of the tumor, the area was extensively and carefully undermined using blunt Metzenbaum scissors. Hemostasis was obtained with spot electrocautery and ligation of vessels where necessary. An initial deep plication sutures of 4.0 Monocryl (total of *** sutures) were placed in the deep, subcutaneous and fascial planes to appose the lateral margins. Two redundant  cutaneous columns were then removed using a #15 blade and the triangulation technique. Hemostasis was again obtained with spot electrocautery. The subcutaneous and dermal layers were then closed with additional ***Vicryl sutures (total of *** sutures). The epidermis was then carefully approximated along the length of the wound using ***Prolene simple running sutures.   The final wound length was *** cm. A total of *** ml of anesthesia was administered for all surgical sites. Estimated blood loss was less than 20 ml for all surgical sites. A sterile pressure dressing was applied and wound care instructions, with a written handout, were given. The patient was discharged from the [facility] alert and ambulatory.

## 2023-07-24 NOTE — PROGRESS NOTES
DERMATOLOGY EXCISION PROCEDURE NOTE    Dermatology Problem List:  1. History of DN  - Junctional DN with Mod-Sev Atypia, R upper lateral back s/p excision 7/24/23  - Junctional DN, R posterior shoulder, sp biopsy 4/26/2022    2. No personal or family history of skin cancer   3. Acne vulgaris with possible dermatitis  - Current tx: metrocream  4. Benign Biopsies   - Compound melanocytic nevus with mild atypia -  left popliteal fossa -bx 3/16/21  - Intradermal melanocytic nevus -  right neck -bx 3/16/21 - pt requested removal  - Predominantly intradermal melanocytic nevus -  left abdomen -bx 3/16/21- pt requested removal  ___________________________________________________________________    NAME OF PROCEDURE: Excision intermediate layered linear closure  Staff surgeon: Dr. Arya Mejia  Fellow: Dr. Princess Bacon  Scrub Nurse: Dalila Ashby, ARELIS    PRE-OPERATIVE DIAGNOSIS:  Dysplastic nevus  POST-OPERATIVE DIAGNOSIS: Same   LOCATION: right upper lateral back  FINAL EXCISION SIZE(DEFECT SIZE): 1.4 x 1.3 cm  MARGIN: 0.5 cm  FINAL REPAIR LENGTH: 3.4 cm   ANESTHESIA: 11 ml 1% lidocaine with 1:100,000 epinephrine    INDICATIONS: This patient presented with a 0.4 x 0.3 cm Dysplastic nevus. Excision was indicated. We discussed the principles of treatment and most likely complications including scarring, bleeding, infection, incomplete excision, wound dehiscence, pain, nerve damage, and recurrence. Informed consent was obtained and the patient underwent the procedure as follows:    PROCEDURE: The patient was taken to the operative suite. Time-out was performed.  The treatment area was anesthetized with 1% lidocaine with epinephrine. The area was prepped with Chlorhexidine and rinsed with sterile saline and draped with sterile towels. The lesion was delineated and excised down to subcutaneous fat in a elliptical manner. Hemostasis was obtained by electrocoagulation.     REPAIR: An intermediate layered linear closure was  selected as the procedure which would maximally preserve both function and cosmesis.    After the excision of the tumor, the area was carefully undermined. Hemostasis was obtained with bipolar electrocoagulation.  Closure was oriented so that the wound was in the patient's natural skin tension lines. The subcutaneous and dermal layers were then closed with 4-0 monocryl sutures. The epidermis was then carefully approximated along the length of the wound using 4-0 monocryl  running subcuticular sutures.     Estimated blood loss was less than 10 ml for all surgical sites. A sterile pressure dressing was applied and wound care instructions, with a written handout, were given. The patient was discharged from the [facility] Center alert and ambulatory.    The patient elected for pathology results to automatically release and understands that the clinical staff will contact them as soon as possible to notify them of the results.    Follow-up in n/a weeks for suture removal.    Dr. Arya Mejia was immediately available for the entire surgery and was physicially present for the key portions of the procedure.    Anatomic Pathology Results: pending    Clinical Follow-Up: Primary dermatologist    Staff Involved:  Staff Only      Staff Physician Comments:   I saw and evaluated the patient with the Mohs Surgery Fellow (Dr. Princess Bacon) and I agree with the assessment and plan and the above description of the procedure. I was present for the key portions of the procedure and entire exam. I was immediately available in the clinic throughout the procedures.       Arya Mejia DO    Department of Dermatology  [facility]: Phone: [phone], Fax:[phone]  [facility]: Phone: [phone], Fax: [phone]

## 2023-07-24 NOTE — LETTER
7/24/2023         RE: Vi Zayas  [address]  [place] [address]        Dear Colleague,    Thank you for referring your patient, Vi Zayas, to the [facility]. Please see a copy of my visit note below.    DERMATOLOGY EXCISION PROCEDURE NOTE    Dermatology Problem List:  1. History of DN  - Junctional DN with Mod-Sev Atypia, R upper lateral back s/p excision 7/24/23  - Junctional DN, R posterior shoulder, sp biopsy 4/26/2022    2. No personal or family history of skin cancer   3. Acne vulgaris with possible dermatitis  - Current tx: metrocream  4. Benign Biopsies   - Compound melanocytic nevus with mild atypia -  left popliteal fossa -bx 3/16/21  - Intradermal melanocytic nevus -  right neck -bx 3/16/21 - pt requested removal  - Predominantly intradermal melanocytic nevus -  left abdomen -bx 3/16/21- pt requested removal  ___________________________________________________________________    NAME OF PROCEDURE: Excision intermediate layered linear closure  Staff surgeon: Dr. Arya Mejia  Fellow: Dr. Princess Bacon  Scrub Nurse: ARELIS Britt    PRE-OPERATIVE DIAGNOSIS:  Dysplastic nevus  POST-OPERATIVE DIAGNOSIS: Same   LOCATION: right upper lateral back  FINAL EXCISION SIZE(DEFECT SIZE): 1.4 x 1.3 cm  MARGIN: 0.5 cm  FINAL REPAIR LENGTH: 3.4 cm   ANESTHESIA: 11 ml 1% lidocaine with 1:100,000 epinephrine    INDICATIONS: This patient presented with a 0.4 x 0.3 cm Dysplastic nevus. Excision was indicated. We discussed the principles of treatment and most likely complications including scarring, bleeding, infection, incomplete excision, wound dehiscence, pain, nerve damage, and recurrence. Informed consent was obtained and the patient underwent the procedure as follows:    PROCEDURE: The patient was taken to the operative suite. Time-out was performed.  The treatment area was anesthetized with 1% lidocaine with epinephrine. The area was prepped with Chlorhexidine and rinsed with  sterile saline and draped with sterile towels. The lesion was delineated and excised down to subcutaneous fat in a elliptical manner. Hemostasis was obtained by electrocoagulation.     REPAIR: An intermediate layered linear closure was selected as the procedure which would maximally preserve both function and cosmesis.    After the excision of the tumor, the area was carefully undermined. Hemostasis was obtained with bipolar electrocoagulation.  Closure was oriented so that the wound was in the patient's natural skin tension lines. The subcutaneous and dermal layers were then closed with 4-0 monocryl sutures. The epidermis was then carefully approximated along the length of the wound using 4-0 monocryl  running subcuticular sutures.     Estimated blood loss was less than 10 ml for all surgical sites. A sterile pressure dressing was applied and wound care instructions, with a written handout, were given. The patient was discharged from the [facility] Center alert and ambulatory.    The patient elected for pathology results to automatically release and understands that the clinical staff will contact them as soon as possible to notify them of the results.    Follow-up in n/a weeks for suture removal.    Dr. Arya Mejia was immediately available for the entire surgery and was physicially present for the key portions of the procedure.    Anatomic Pathology Results: pending    Clinical Follow-Up: Primary dermatologist    Staff Involved:  Staff Only      Staff Physician Comments:   I saw and evaluated the patient with the Mohs Surgery Fellow (Dr. Princess Bacon) and I agree with the assessment and plan and the above description of the procedure. I was present for the key portions of the procedure and entire exam. I was immediately available in the clinic throughout the procedures.       Arya Mejia DO    Department of Dermatology  [facility]  Clinics: Phone: [phone], Fax:[phone]  [facility]: Phone: [phone], Fax: [phone]      Again, thank you for allowing me to participate in the care of your patient.        Sincerely,        Arya Mejia MD

## 2023-08-03 ENCOUNTER — TELEPHONE (OUTPATIENT)
Dept: DERMATOLOGY | Facility: CLINIC | Age: 33
End: 2023-08-03
Payer: COMMERCIAL

## 2023-08-03 NOTE — TELEPHONE ENCOUNTER
Result Notes     Alice Castellon RN  8/3/2023  4:20 PM CDT Back to Top      I reviewed the pathology results with patient. I also scheduled her for a skin check with Dr. Malone in April, per Dr. Mejia's recommendation. Pt is also wondering about reducing the appearance of the scar. I advised pt that she can use Mederma on the area once it is completely healed.    Penny Eric RN  8/3/2023  9:05 AM CDT       Writer called pt, no answer. Left message for pt to return our call at [phone].        Penny Eric RN on 8/3/2023 at 9:05 AM    Arya Mejia MD  8/2/2023  4:53 PM CDT       Please call the patient with pathology results.     The pathologist thought we were successful removing the atypical mole from her back. He did not see anything to upstage the diagnosis to skin cancer (it is still considered a benign mole). As long as the wound is healing well, no additional surgery is necessary.     It looks like her next skin cancer screening is more than a year away. I recommend a skin cancer screening visit in April 2024 (a year from her 1st biopsy), then Dr. Malone can decide if an annual visit is necessary.     Thank you.

## 2024-04-03 NOTE — PROGRESS NOTES
Patient declines  today.     [facility] Dermatology Note  Encounter Date: Apr 4, 2024  Office Visit     Dermatology Problem List:  1. 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 Acne vulgaris with possible dermatitis  - Current tx: metrocream  3. Benign Biopsies   - Compound melanocytic nevus with mild atypia -  left popliteal fossa -bx 3/16/21  - Intradermal melanocytic nevus -  right neck -bx 3/16/21 - pt requested removal  - Predominantly intradermal melanocytic nevus -  left abdomen -bx 3/16/21- pt requested removal    Patient is currently Pre-Med    ____________________________________________    Assessment & Plan:    # History of dysplastic nevus  nevus. No clinical evidence of recurrence  - ABCDEs: Counseled ABCDEs of melanoma: Asymmetry, Border (irregularity), Color (not uniform, changes in color), Diameter (greater than 6 mm which is about the size of a pencil eraser), and Evolving (any changes in preexisting moles).  - Sun protection: Counseled SPF30+ sunscreen, UPF clothing, sun avoidance, tanning bed avoidance.  - Recommended regular skin exams.       # Acne vulgaris with possible perioral dermatitis, likely flared with mask use. remains clear, wants refills  - Continue metrocream BID. Refilled      Procedures Performed:   NA    Follow-up: 1 year, in person, earlier for new or changing lesions    Staff and Scribe:     Scribe Disclosure:   I, Velia South, am serving as a scribe to document services personally performed by Louisa Malone MD based on data collection and the provider's statements to me.       Provider Disclosure:   The documentation recorded by the scribe accurately reflects the services I personally performed and the decisions made by me.    Louisa Malone MD    Department of Dermatology  [facility]  [facility]: Phone: [phone], Fax:[phone]  [facility]: Phone: [phone], Fax: [phone]   ____________________________________________    CC: Skin Check (Full body skin check. No spots of concern. Personal history of DN.)    HPI:  Ms. Vi Zayas is a(n) 33 year old female who presents today as a return patient for skin check.    Today, patient reports Nothing bleeding, crusting, or changing. She request refill of her metrocream.    She is not pregnant or BF    Patient is otherwise feeling well, without additional skin concerns.    Labs Reviewed:  N/A    Physical Exam:  Vitals: There were no vitals taken for this visit.  SKIN: Total skin excluding the undergarment areas was performed. The exam included the head/face, neck, both arms, chest, back, abdomen, both legs, digits and/or nails. Declines buttock exam.Penny Reyna RN was present.      - There is no erythema, telangectasias, nodularity, or pigmentation on the sites of prior DN..   - No other lesions of concern on areas examined.     Medications:  Current Outpatient Medications   Medication Sig Dispense Refill    metroNIDAZOLE (METROCREAM) 0.75 % external cream Apply twice daily 45 g 5    norgestimate-ethinyl estradiol (ESTARYLLA) 0.25-35 MG-MCG tablet Take 1 tablet by mouth daily 84 tablet 3     No current facility-administered medications for this visit.      Past Medical History:   Patient Active Problem List   Diagnosis    Acne vulgaris    Multiple benign nevi    History of dysplastic nevus     Past Medical History:   Diagnosis Date    NO ACTIVE PROBLEMS         CC No referring provider defined for this encounter. on close of this encounter.

## 2024-04-03 NOTE — PATIENT INSTRUCTIONS

## 2024-04-04 ENCOUNTER — OFFICE VISIT (OUTPATIENT)
Dept: DERMATOLOGY | Facility: CLINIC | Age: 34
End: 2024-04-04
Payer: COMMERCIAL

## 2024-04-04 DIAGNOSIS — L70.0 ACNE VULGARIS: ICD-10-CM

## 2024-04-04 DIAGNOSIS — Z86.018 HISTORY OF DYSPLASTIC NEVUS: Primary | ICD-10-CM

## 2024-04-04 PROCEDURE — 99214 OFFICE O/P EST MOD 30 MIN: CPT | Performed by: DERMATOLOGY

## 2024-04-04 NOTE — LETTER
4/4/2024         RE: Vi Zayas  [address]  [place] [address]        Dear Colleague,    Thank you for referring your patient, Vi Zayas, to the [facility]. Please see a copy of my visit note below.        Patient declines  today.     [facility] Dermatology Note  Encounter Date: Apr 4, 2024  Office Visit     Dermatology Problem List:  1. 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 Acne vulgaris with possible dermatitis  - Current tx: metrocream  3. Benign Biopsies   - Compound melanocytic nevus with mild atypia -  left popliteal fossa -bx 3/16/21  - Intradermal melanocytic nevus -  right neck -bx 3/16/21 - pt requested removal  - Predominantly intradermal melanocytic nevus -  left abdomen -bx 3/16/21- pt requested removal    Patient is currently Pre-Med    ____________________________________________    Assessment & Plan:    # History of dysplastic nevus  nevus. No clinical evidence of recurrence  - ABCDEs: Counseled ABCDEs of melanoma: Asymmetry, Border (irregularity), Color (not uniform, changes in color), Diameter (greater than 6 mm which is about the size of a pencil eraser), and Evolving (any changes in preexisting moles).  - Sun protection: Counseled SPF30+ sunscreen, UPF clothing, sun avoidance, tanning bed avoidance.  - Recommended regular skin exams.       # Acne vulgaris with possible perioral dermatitis, likely flared with mask use. remains clear, wants refills  - Continue metrocream BID. Refilled      Procedures Performed:   NA    Follow-up: 1 year, in person, earlier for new or changing lesions    Staff and Scribe:     Scribe Disclosure:   I, Velia South, am serving as a scribe to document services personally performed by Louisa Malone MD based on data collection and the provider's statements to me.       Provider Disclosure:    The documentation recorded by the scribe accurately reflects the services I personally performed and the decisions made by me.    Louisa Malone MD    Department of Dermatology  [facility]: Phone: [phone], Fax:[phone]  [facility]: Phone: [phone], Fax: [phone]   ____________________________________________    CC: Skin Check (Full body skin check. No spots of concern. Personal history of DN.)    HPI:  Ms. Vi Zayas is a(n) 33 year old female who presents today as a return patient for skin check.    Today, patient reports Nothing bleeding, crusting, or changing. She request refill of her metrocream.    She is not pregnant or BF    Patient is otherwise feeling well, without additional skin concerns.    Labs Reviewed:  N/A    Physical Exam:  Vitals: There were no vitals taken for this visit.  SKIN: Total skin excluding the undergarment areas was performed. The exam included the head/face, neck, both arms, chest, back, abdomen, both legs, digits and/or nails. Declines buttock exam.Penny Reyna RN was present.      - There is no erythema, telangectasias, nodularity, or pigmentation on the sites of prior DN..   - No other lesions of concern on areas examined.     Medications:  Current Outpatient Medications   Medication Sig Dispense Refill     metroNIDAZOLE (METROCREAM) 0.75 % external cream Apply twice daily 45 g 5     norgestimate-ethinyl estradiol (ESTARYLLA) 0.25-35 MG-MCG tablet Take 1 tablet by mouth daily 84 tablet 3     No current facility-administered medications for this visit.      Past Medical History:   Patient Active Problem List   Diagnosis     Acne vulgaris     Multiple benign nevi     History of dysplastic nevus     Past Medical History:   Diagnosis Date     NO ACTIVE PROBLEMS         CC No referring provider defined for this encounter. on close of this  encounter.      Again, thank you for allowing me to participate in the care of your patient.        Sincerely,        Louisa Malone MD

## 2024-04-04 NOTE — NURSING NOTE
Vi Zayas's goals for this visit include:   Chief Complaint   Patient presents with    Skin Check     Full body skin check. No spots of concern. Personal history of DN.       She requests these members of her care team be copied on today's visit information:     PCP: Rich Bright [facility]    Referring Provider:  No referring provider defined for this encounter.    There were no vitals taken for this visit.    Do you need any medication refills at today's visit? Yes  Yecenia Renee CMA

## 2024-04-30 DIAGNOSIS — Z30.41 ENCOUNTER FOR SURVEILLANCE OF CONTRACEPTIVE PILLS: ICD-10-CM

## 2024-04-30 NOTE — TELEPHONE ENCOUNTER
Requested Prescriptions   Pending Prescriptions Disp Refills    ESTARYLLA 0.25-35 MG-MCG tablet [Pharmacy Med Name: ESTARYLLA TABLETS 28S] 28 tablet      Sig: TAKE 1 TABLET BY MOUTH DAILY       There is no refill protocol information for this order        Pt last seen 5/3/2023 for med recheck    Last prescribed 5/3/2023 for 84 tablets with 3 refills    RN sent mychart reminder to schedule annual    Kelsy Dumas RN on 4/30/2024 at 7:40 AM

## 2024-05-01 NOTE — TELEPHONE ENCOUNTER
Pt has read the ENDYMION appt reminder but there is no appt scheduled yet.    Will check back tomorrow.    Sharron Olson RN

## 2024-05-02 NOTE — TELEPHONE ENCOUNTER
Unable to reach patient via phone. RN left a message using  services and instructed patient to call the clinic at [phone].    Kelsy Dumas RN on 5/2/2024 at 8:45 AM

## 2024-05-03 RX ORDER — NORGESTIMATE AND ETHINYL ESTRADIOL 0.25-0.035
1 KIT ORAL DAILY
Qty: 28 TABLET | Refills: 0 | Status: SHIPPED | OUTPATIENT
Start: 2024-05-03 | End: 2024-05-14

## 2024-05-14 ENCOUNTER — OFFICE VISIT (OUTPATIENT)
Dept: OBGYN | Facility: CLINIC | Age: 34
End: 2024-05-14
Payer: COMMERCIAL

## 2024-05-14 VITALS
HEART RATE: 76 BPM | BODY MASS INDEX: 20.28 KG/M2 | DIASTOLIC BLOOD PRESSURE: 71 MMHG | WEIGHT: 126.2 LBS | OXYGEN SATURATION: 98 % | HEIGHT: 66 IN | SYSTOLIC BLOOD PRESSURE: 105 MMHG

## 2024-05-14 DIAGNOSIS — Z30.8 ENCOUNTER FOR OTHER CONTRACEPTIVE MANAGEMENT: ICD-10-CM

## 2024-05-14 DIAGNOSIS — Z01.419 ENCOUNTER FOR GYNECOLOGICAL EXAMINATION WITHOUT ABNORMAL FINDING: Primary | ICD-10-CM

## 2024-05-14 PROCEDURE — 99395 PREV VISIT EST AGE 18-39: CPT | Performed by: NURSE PRACTITIONER

## 2024-05-14 PROCEDURE — 99459 PELVIC EXAMINATION: CPT | Performed by: NURSE PRACTITIONER

## 2024-05-14 RX ORDER — DESOGESTREL AND ETHINYL ESTRADIOL 0.15-0.03
1 KIT ORAL DAILY
Qty: 84 TABLET | Refills: 3 | Status: SHIPPED | OUTPATIENT
Start: 2024-05-14

## 2024-05-14 RX ORDER — NORETHINDRONE ACETATE AND ETHINYL ESTRADIOL 1.5-30(21)
1 KIT ORAL DAILY
Status: CANCELLED | OUTPATIENT
Start: 2024-05-14

## 2024-05-14 RX ORDER — NORGESTIMATE AND ETHINYL ESTRADIOL 0.25-0.035
1 KIT ORAL DAILY
Qty: 84 TABLET | Refills: 3 | Status: CANCELLED | OUTPATIENT
Start: 2024-05-14

## 2024-05-14 NOTE — PROGRESS NOTES
SUBJECTIVE:   Vi Zayas  is a 33 year old, presenting for the following health issues:  Physical  HPI     Patient is noting very minimal bleeding during placebo week on pills, has missed bleeding altogether twice. Takes pills regularly.         4/4/2024     2:32 PM 4/21/2023     2:41 PM   PHQ-2 ( 1999 [employer])   Q1: Little interest or pleasure in doing things 0 0   Q2: Feeling down, depressed or hopeless 0 0   PHQ-2 Score 0 0         Reviewed orders with patient.  Reviewed health maintenance and updated orders accordingly - Yes  Patient Active Problem List   Diagnosis    Acne vulgaris    Multiple benign nevi    History of dysplastic nevus     Past Surgical History:   Procedure Laterality Date    NO HISTORY OF SURGERY         Social History     Tobacco Use    Smoking status: Never    Smokeless tobacco: Never   Substance Use Topics    Alcohol use: Yes     Comment: rarely     Family History   Problem Relation Age of Onset    Stomach Cancer Paternal Grandmother            Breast Cancer Screening:   Mammogram Screening - Patient under 40 years of age: Routine Mammogram Screening not recommended.   Pertinent mammograms are reviewed under the imaging tab.    History of abnormal Pap smear: NO - age 30-65 PAP every 5 years with negative HPV co-testing recommended  Last 3 Pap and HPV Results:       Latest Ref Rng & Units 5/3/2023     2:08 PM 8/26/2020     2:09 PM   PAP / HPV   PAP  Negative for Intraepithelial Lesion or Malignancy (NILM)     PAP (Historical)   NIL    HPV 16 DNA Negative Negative     HPV 18 DNA Negative Negative     Other HR HPV Negative Negative         Past Medical History:   Diagnosis Date    NO ACTIVE PROBLEMS       Past Surgical History:   Procedure Laterality Date    NO HISTORY OF SURGERY         Review of Systems  Constitutional, neuro, ENT, endocrine, pulmonary, cardiac, gastrointestinal, genitourinary, musculoskeletal, integument and psychiatric systems are negative, except as otherwise  "noted.    OBJECTIVE:   /71 (BP Location: Right arm, Patient Position: Sitting, Cuff Size: Adult Regular)   Pulse 76   Ht 1.683 m (5' 6.26\")   Wt 57.2 kg (126 lb 3.2 oz)   LMP 04/20/2024 (Exact Date)   SpO2 98%   BMI 20.21 kg/m    Physical Exam   GENERAL: alert and no distress  RESP: lungs clear to auscultation - no rales, rhonchi or wheezes  BREAST: normal without masses, tenderness or nipple discharge and no palpable axillary masses or adenopathy  CV: regular rate and rhythm, normal S1 S2, no S3 or S4, no murmur, click or rub, no peripheral edema  ABDOMEN: soft, nontender, no hepatosplenomegaly, no masses and bowel sounds normal   (female) w/bimanual: normal female external genitalia, normal urethral meatus, normal vaginal mucosa, and normal cervix/adnexa/uterus without masses or discharge  MS: no gross musculoskeletal defects noted, no edema  SKIN: no suspicious lesions or rashes  NEURO: Normal strength and tone, mentation intact and speech normal  PSYCH: mentation appears normal, affect normal/bright      ASSESSMENT/PLAN:   Encounter for gynecological examination without abnormal finding  Health Maintenance reviewed.  - ND PELVIC EXAMINATION    Encounter for other contraceptive management  We discussed that minimal/absent bleeding while on placebo pills on oral contraceptive pill is not necessarily abnormal or concerning. Discussed thinning of the endometrial lining and how that can prevent withdrawal bleeding. Discussed option to continue on same pills vs alternate pills vs alternate contraceptive method. Patient would like to try a different oral contraceptive pill, discussed rationale when to change, advised it may take a few cycles to adjust.  - desogestrel-ethinyl estradiol (APRI) 0.15-30 MG-MCG tablet; Take 1 tablet by mouth daily    Counseling  Reviewed preventive health counseling, as reflected in patient instructions  Special attention given to:        Regular exercise       Healthy " diet/nutrition       Contraception      Signed Electronically by: KY Ziegler CNP

## 2024-05-14 NOTE — PATIENT INSTRUCTIONS
If you have any questions regarding your visit, Please contact your care team.     [facility]: [phone]  To Schedule an Appointment 24/7  Call: [phone]     Lisa Zaragoza- APRN CNP      [facility]-Surgery Scheduler  [facility] Scheduler         Monday 7:30am-2:00pm    Tuesday 7:30am-4:00pm    Wednesday 7:30am-2:00pm    Thursday 7:30am-11:00am    Friday 7:30am-2:00pm [address] [address]  Phone- [phone]   Fax- [phone]     Imaging Scheduling all locations  [phone]    [facility] Labor and Delivery  [address] [place] [address]   [place] [address] [phone]         Urgent Care locations:  [facility]   Monday-Friday  10 am - 8 pm  Saturday and Sunday   9 am - 5 pm     [phone]   If you need a medication refill, please contact your pharmacy. Please allow 3 business days for your refill to be completed.  As always, Thank you for trusting us with your healthcare needs!      see additional instructions from your care team below

## 2024-05-21 ENCOUNTER — OFFICE VISIT (OUTPATIENT)
Dept: FAMILY MEDICINE | Facility: CLINIC | Age: 34
End: 2024-05-21
Payer: COMMERCIAL

## 2024-05-21 VITALS
OXYGEN SATURATION: 98 % | RESPIRATION RATE: 12 BRPM | SYSTOLIC BLOOD PRESSURE: 106 MMHG | TEMPERATURE: 98.1 F | HEART RATE: 79 BPM | HEIGHT: 66 IN | BODY MASS INDEX: 20.09 KG/M2 | DIASTOLIC BLOOD PRESSURE: 78 MMHG | WEIGHT: 125 LBS

## 2024-05-21 DIAGNOSIS — L65.9 HAIR LOSS: Primary | ICD-10-CM

## 2024-05-21 PROCEDURE — 82306 VITAMIN D 25 HYDROXY: CPT | Performed by: PHYSICIAN ASSISTANT

## 2024-05-21 PROCEDURE — 99000 SPECIMEN HANDLING OFFICE-LAB: CPT | Performed by: PHYSICIAN ASSISTANT

## 2024-05-21 PROCEDURE — 84443 ASSAY THYROID STIM HORMONE: CPT | Performed by: PHYSICIAN ASSISTANT

## 2024-05-21 PROCEDURE — 82728 ASSAY OF FERRITIN: CPT | Performed by: PHYSICIAN ASSISTANT

## 2024-05-21 PROCEDURE — 84630 ASSAY OF ZINC: CPT | Mod: 90 | Performed by: PHYSICIAN ASSISTANT

## 2024-05-21 PROCEDURE — 36415 COLL VENOUS BLD VENIPUNCTURE: CPT | Performed by: PHYSICIAN ASSISTANT

## 2024-05-21 PROCEDURE — 83540 ASSAY OF IRON: CPT | Performed by: PHYSICIAN ASSISTANT

## 2024-05-21 PROCEDURE — 83550 IRON BINDING TEST: CPT | Performed by: PHYSICIAN ASSISTANT

## 2024-05-21 PROCEDURE — 99213 OFFICE O/P EST LOW 20 MIN: CPT | Performed by: PHYSICIAN ASSISTANT

## 2024-05-21 ASSESSMENT — PAIN SCALES - GENERAL: PAINLEVEL: NO PAIN (0)
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
## 2024-05-22 LAB
FERRITIN SERPL-MCNC: 28 NG/ML (ref 6–175)
IRON BINDING CAPACITY (ROCHE): 342 UG/DL (ref 240–430)
IRON SATN MFR SERPL: 63 % (ref 15–46)
IRON SERPL-MCNC: 214 UG/DL (ref 37–145)
TSH SERPL DL<=0.005 MIU/L-ACNC: 0.99 UIU/ML (ref 0.3–4.2)
VIT D+METAB SERPL-MCNC: 36 NG/ML (ref 20–50)

## 2024-05-23 ENCOUNTER — TELEPHONE (OUTPATIENT)
Dept: FAMILY MEDICINE | Facility: CLINIC | Age: 34
End: 2024-05-23
Payer: COMMERCIAL

## 2024-05-23 DIAGNOSIS — E60 LOW ZINC LEVEL: ICD-10-CM

## 2024-05-23 DIAGNOSIS — E61.1 IRON DEFICIENCY: Primary | ICD-10-CM

## 2024-05-23 LAB — ZINC SERPL-MCNC: 58.7 UG/DL

## 2024-05-23 RX ORDER — FERROUS GLUCONATE 324(38)MG
324 TABLET ORAL
Qty: 90 TABLET | Refills: 3 | Status: SHIPPED | OUTPATIENT
Start: 2024-05-23

## 2024-05-23 NOTE — TELEPHONE ENCOUNTER
It looks like zinc supplements are usually 50 mg when taken by themselves. Given her level, I would take this otc supplement 50 mg every other day.     I looked more into the iron labs, and because her ferritin is low the serum iron can be high from the liver trying to make more of it, so she is technically still iron deficient. With the rapid hair loss, supplementing with iron makes sense.  I have sent over this rx. Recheck labs in 2 months. Ok to use miralax as needed if she gets constipated from the iron.     Toshia Hansen PA-C

## 2024-05-23 NOTE — TELEPHONE ENCOUNTER
RN called patient and relayed providers message. Patient verbalized understanding. RN assisted with scheduling lab only appointment 7/25/24    Patient updated that her hair supplement contains 0.75 mg of zinc. She wants to clarify if she can still take her hair supplement in addition to the 50 mg of zinc every other day?    Patient also wanted RN to write you and ensure it these new medications are safe to take with her birth control and hair supplement.      Jocelyn Ye RN on 5/23/2024 at 4:53 PM

## 2024-05-23 NOTE — TELEPHONE ENCOUNTER
----- Message from Toshia Hansen PA-C sent at 5/23/2024 12:06 PM CDT -----  PLEASE CALL PATIENT:  Dear Vi,      It was a pleasure to see you at your recent office visit.  Your test results are listed below.    1)  Zinc level is just slightly low. How much zinc per day supplementation are you currently taking?     2)  ferritin or iron storage lab is below 50 which is on the lower end. However your serum iron is elevated. Are you taking any iron supplements currently? If so when did you start? If may be that the ferritin hasn't build up yet if you started in the last 2 months.     3) thyroid hormone is normal. Vitamin d is normal.         If you have any questions or concerns, please call the clinic at [phone].    Sincerely,  Toshia Hansen PA-C

## 2024-05-23 NOTE — RESULT ENCOUNTER NOTE
PLEASE CALL PATIENT:  Dear Vi,      It was a pleasure to see you at your recent office visit.  Your test results are listed below.    1)  Zinc level is just slightly low. How much zinc per day supplementation are you currently taking?     2)  ferritin or iron storage lab is below 50 which is on the lower end. However your serum iron is elevated. Are you taking any iron supplements currently? If so when did you start? If may be that the ferritin hasn't build up yet if you started in the last 2 months.     3) thyroid hormone is normal. Vitamin d is normal.         If you have any questions or concerns, please call the clinic at [phone].    Sincerely,  Toshia Hansen PA-C

## 2024-05-23 NOTE — TELEPHONE ENCOUNTER
RN called patient and relayed providers message. Patient verbalized understanding.       Jocelyn Ye RN on 5/23/2024 at 5:24 PM

## 2024-05-23 NOTE — TELEPHONE ENCOUNTER
RN called patient and relayed providers message.      Patient states she is only taking birth control and a supplement 1x/day in the AM for hair health. She reports the supplement for her hair has 1.5 mg of zinc in it.     Patient denies taking any iron currently.       Jocelyn Ye RN on 5/23/2024 at 12:20 PM

## 2024-07-25 ENCOUNTER — LAB (OUTPATIENT)
Dept: LAB | Facility: CLINIC | Age: 34
End: 2024-07-25
Payer: COMMERCIAL

## 2024-07-25 DIAGNOSIS — E61.1 IRON DEFICIENCY: ICD-10-CM

## 2024-07-25 DIAGNOSIS — E60 LOW ZINC LEVEL: ICD-10-CM

## 2024-07-25 LAB — FERRITIN SERPL-MCNC: 52 NG/ML (ref 6–175)

## 2024-07-25 PROCEDURE — 99000 SPECIMEN HANDLING OFFICE-LAB: CPT

## 2024-07-25 PROCEDURE — 36415 COLL VENOUS BLD VENIPUNCTURE: CPT

## 2024-07-25 PROCEDURE — 84630 ASSAY OF ZINC: CPT | Mod: 90

## 2024-07-25 PROCEDURE — 82728 ASSAY OF FERRITIN: CPT

## 2024-07-27 LAB — ZINC SERPL-MCNC: 71.7 UG/DL

## 2024-07-29 NOTE — RESULT ENCOUNTER NOTE
Nanci Lebron,       Your recent test results are attached, if you have any questions or concerns please feel free to contact me via e-mail or call [phone].  Zinc level is now normal. Ferritin is normal as well (iron storage).           Sincerely,  Toshia Hansen PA-C

## 2024-08-27 ENCOUNTER — OFFICE VISIT (OUTPATIENT)
Dept: FAMILY MEDICINE | Facility: CLINIC | Age: 34
End: 2024-08-27
Payer: COMMERCIAL

## 2024-08-27 VITALS
DIASTOLIC BLOOD PRESSURE: 72 MMHG | HEART RATE: 85 BPM | OXYGEN SATURATION: 99 % | TEMPERATURE: 98.3 F | RESPIRATION RATE: 16 BRPM | HEIGHT: 67 IN | SYSTOLIC BLOOD PRESSURE: 104 MMHG | WEIGHT: 121.8 LBS | BODY MASS INDEX: 19.12 KG/M2

## 2024-08-27 DIAGNOSIS — R19.7 DIARRHEA OF PRESUMED INFECTIOUS ORIGIN: Primary | ICD-10-CM

## 2024-08-27 DIAGNOSIS — A04.72 C. DIFFICILE DIARRHEA: ICD-10-CM

## 2024-08-27 DIAGNOSIS — R10.9 ABDOMINAL CRAMPING: ICD-10-CM

## 2024-08-27 LAB
ALBUMIN SERPL BCG-MCNC: 4.4 G/DL (ref 3.5–5.2)
ALP SERPL-CCNC: 48 U/L (ref 40–150)
ALT SERPL W P-5'-P-CCNC: <5 U/L (ref 0–50)
ANION GAP SERPL CALCULATED.3IONS-SCNC: 9 MMOL/L (ref 7–15)
AST SERPL W P-5'-P-CCNC: 18 U/L (ref 0–45)
BASOPHILS # BLD AUTO: 0.1 10E3/UL (ref 0–0.2)
BASOPHILS NFR BLD AUTO: 1 %
BILIRUB SERPL-MCNC: 0.4 MG/DL
BUN SERPL-MCNC: 9.7 MG/DL (ref 6–20)
C DIFF GDH STL QL IA: NEGATIVE
C DIFF TOX A+B STL QL IA: NEGATIVE
C DIFF TOX B STL QL: POSITIVE
CALCIUM SERPL-MCNC: 9.3 MG/DL (ref 8.8–10.4)
CHLORIDE SERPL-SCNC: 104 MMOL/L (ref 98–107)
CREAT SERPL-MCNC: 1 MG/DL (ref 0.51–0.95)
EGFRCR SERPLBLD CKD-EPI 2021: 76 ML/MIN/1.73M2
EOSINOPHIL # BLD AUTO: 0.2 10E3/UL (ref 0–0.7)
EOSINOPHIL NFR BLD AUTO: 2 %
ERYTHROCYTE [DISTWIDTH] IN BLOOD BY AUTOMATED COUNT: 13 % (ref 10–15)
GLUCOSE SERPL-MCNC: 90 MG/DL (ref 70–99)
HCG UR QL: NEGATIVE
HCO3 SERPL-SCNC: 25 MMOL/L (ref 22–29)
HCT VFR BLD AUTO: 40.6 % (ref 35–47)
HGB BLD-MCNC: 13.2 G/DL (ref 11.7–15.7)
IMM GRANULOCYTES # BLD: 0 10E3/UL
IMM GRANULOCYTES NFR BLD: 0 %
LYMPHOCYTES # BLD AUTO: 2.4 10E3/UL (ref 0.8–5.3)
LYMPHOCYTES NFR BLD AUTO: 26 %
MCH RBC QN AUTO: 29.8 PG (ref 26.5–33)
MCHC RBC AUTO-ENTMCNC: 32.5 G/DL (ref 31.5–36.5)
MCV RBC AUTO: 92 FL (ref 78–100)
MONOCYTES # BLD AUTO: 0.5 10E3/UL (ref 0–1.3)
MONOCYTES NFR BLD AUTO: 6 %
NEUTROPHILS # BLD AUTO: 6.3 10E3/UL (ref 1.6–8.3)
NEUTROPHILS NFR BLD AUTO: 66 %
PLATELET # BLD AUTO: 264 10E3/UL (ref 150–450)
POTASSIUM SERPL-SCNC: 4.5 MMOL/L (ref 3.4–5.3)
PROT SERPL-MCNC: 7.1 G/DL (ref 6.4–8.3)
RBC # BLD AUTO: 4.43 10E6/UL (ref 3.8–5.2)
SODIUM SERPL-SCNC: 138 MMOL/L (ref 135–145)
WBC # BLD AUTO: 9.5 10E3/UL (ref 4–11)

## 2024-08-27 PROCEDURE — 81025 URINE PREGNANCY TEST: CPT | Performed by: PHYSICIAN ASSISTANT

## 2024-08-27 PROCEDURE — 36415 COLL VENOUS BLD VENIPUNCTURE: CPT | Performed by: PHYSICIAN ASSISTANT

## 2024-08-27 PROCEDURE — 80053 COMPREHEN METABOLIC PANEL: CPT | Performed by: PHYSICIAN ASSISTANT

## 2024-08-27 PROCEDURE — 99213 OFFICE O/P EST LOW 20 MIN: CPT | Performed by: PHYSICIAN ASSISTANT

## 2024-08-27 PROCEDURE — 87324 CLOSTRIDIUM AG IA: CPT | Mod: 59 | Performed by: PHYSICIAN ASSISTANT

## 2024-08-27 PROCEDURE — 87507 IADNA-DNA/RNA PROBE TQ 12-25: CPT | Performed by: PHYSICIAN ASSISTANT

## 2024-08-27 PROCEDURE — 87493 C DIFF AMPLIFIED PROBE: CPT | Mod: 59 | Performed by: PHYSICIAN ASSISTANT

## 2024-08-27 PROCEDURE — 85025 COMPLETE CBC W/AUTO DIFF WBC: CPT | Performed by: PHYSICIAN ASSISTANT

## 2024-08-27 ASSESSMENT — PAIN SCALES - GENERAL: PAINLEVEL: NO PAIN (0)

## 2024-08-27 ASSESSMENT — ENCOUNTER SYMPTOMS: DIARRHEA: 1

## 2024-08-27 NOTE — PROGRESS NOTES
Assessment & Plan     (R19.7) Diarrhea of presumed infectious origin  (primary encounter diagnosis)  Comment: Patient with diarrhea over the last month.  Patient had finished a course of antibiotics and has since been having yellow mucus stool.  No blood in the stool.  Cramping lower abdominal pain.  Discussed with patient potential for C. difficile, bacterial etiology, fire risk, amongst others.  Highest suspicion is C. difficile.  Discussed stool studies.  Labs in process.  Encouraged proper hydration.  Patient is afebrile here.  If she develops any severe new abdominal pain, bloating, the ability to pass stool she will seek emergent evaluation.  Plan: C. difficile Toxin B PCR with reflex to C.         difficile Antigen and Toxins A/B EIA, Enteric         Bacteria and Virus Panel by [facility] Stool, CBC with        platelets and differential, Comprehensive         metabolic panel (BMP + Alb, Alk Phos, ALT, AST,        Total. Bili, TP)          (R10.9) Abdominal cramping  Comment: Abdominal cramping.  Has missed a menstrual cycle.  Discussed with patient checking urine pregnancy test to ensure no possibility of pregnancy.  Has been taking oral contraceptives.  Plan: CBC with platelets and differential,         Comprehensive metabolic panel (BMP + Alb, Alk         Phos, ALT, AST, Total. Bili, TP), HCG         qualitative urine          Subjective   Vi is a 34 year old, presenting for the following health issues:  Diarrhea (On and off diarrhea for about a month. Pt said that she started experiencing diarrhea after taking Augmentin. ) and Contraception (Pt also wants to talk about missed period. )      8/27/2024    10:48 AM   Additional Questions   Roomed by SARA NEGRON   Accompanied by CHILDREN.     History of Present Illness       Reason for visit:  Long-term ( a month) upset stomach.   She is taking medications regularly.     Patient started on Augmentin 7/9/2024 following cat bite.  Since completion of  "antibiotics has had multiple bouts of loose stool.  Has between 2-14 bowel movements per day.  Stool is yellow in color.  Has had mucus.  No bloody stool.  Has been trialing Pepto-Bismol without any significant relief.  Mild lower abdominal cramping.  No fevers or chills.  No dysuria or hematuria.  Patient did miss her menstrual cycle.  Patient denies any other symptoms.  No other known ill exposures        Review of Systems  Constitutional, HEENT, cardiovascular, pulmonary, gi and gu systems are negative, except as otherwise noted.      Objective    /72   Pulse 85   Temp 98.3  F (36.8  C) (Tympanic)   Resp 16   Ht 1.69 m (5' 6.54\")   Wt 55.2 kg (121 lb 12.8 oz)   SpO2 99%   BMI 19.34 kg/m    Body mass index is 19.34 kg/m .  Physical Exam   GENERAL: alert and no distress  RESP: lungs clear to auscultation - no rales, rhonchi or wheezes  CV: regular rate and rhythm, normal S1 S2, no S3 or S4, no murmur, click or rub, no peripheral edema  ABDOMEN: soft, nontender, no hepatosplenomegaly, no masses and bowel sounds normal  MS: no gross musculoskeletal defects noted, no edema            Signed Electronically by: Avi Quinn PA-C    "

## 2024-08-28 ENCOUNTER — TELEPHONE (OUTPATIENT)
Dept: FAMILY MEDICINE | Facility: CLINIC | Age: 34
End: 2024-08-28
Payer: COMMERCIAL

## 2024-08-28 LAB
ADV 40+41 DNA STL QL NAA+NON-PROBE: NEGATIVE
ASTRO TYP 1-8 RNA STL QL NAA+NON-PROBE: NEGATIVE
C CAYETANENSIS DNA STL QL NAA+NON-PROBE: NEGATIVE
CAMPYLOBACTER DNA SPEC NAA+PROBE: NEGATIVE
CRYPTOSP DNA STL QL NAA+NON-PROBE: NEGATIVE
E COLI O157 DNA STL QL NAA+NON-PROBE: NORMAL
E HISTOLYT DNA STL QL NAA+NON-PROBE: NEGATIVE
EAEC ASTA GENE ISLT QL NAA+PROBE: NEGATIVE
EC STX1+STX2 GENES STL QL NAA+NON-PROBE: NEGATIVE
EPEC EAE GENE STL QL NAA+NON-PROBE: NEGATIVE
ETEC LTA+ST1A+ST1B TOX ST NAA+NON-PROBE: NEGATIVE
G LAMBLIA DNA STL QL NAA+NON-PROBE: NEGATIVE
NOROVIRUS GI+II RNA STL QL NAA+NON-PROBE: NEGATIVE
P SHIGELLOIDES DNA STL QL NAA+NON-PROBE: NEGATIVE
RVA RNA STL QL NAA+NON-PROBE: NEGATIVE
SALMONELLA SP RPOD STL QL NAA+PROBE: NEGATIVE
SAPO I+II+IV+V RNA STL QL NAA+NON-PROBE: NEGATIVE
SHIGELLA SP+EIEC IPAH ST NAA+NON-PROBE: NEGATIVE
V CHOLERAE DNA SPEC QL NAA+PROBE: NEGATIVE
VIBRIO DNA SPEC NAA+PROBE: NEGATIVE
Y ENTEROCOL DNA STL QL NAA+PROBE: NEGATIVE

## 2024-08-28 RX ORDER — VANCOMYCIN HYDROCHLORIDE 125 MG/1
125 CAPSULE ORAL 4 TIMES DAILY
Qty: 40 CAPSULE | Refills: 0 | Status: SHIPPED | OUTPATIENT
Start: 2024-08-28 | End: 2024-09-07

## 2024-08-28 NOTE — TELEPHONE ENCOUNTER
Prior Authorization Retail Medication Request    Medication/Dose: vancomycin (VANCOCIN) 125 MG capsule  Diagnosis and ICD code (if different than what is on RX):  C. difficile diarrhea [A04.72]   New/renewal/insurance change PA/secondary ins. PA:  Previously Tried and Failed:    Rationale:      Insurance   Primary: [phone]  Insurance ID:  [id number]    Secondary (if applicable):  Insurance ID:      Pharmacy Information (if different than what is on RX)  Name:  [facility]  Phone:  [phone]  Fax:[phone]

## 2024-08-28 NOTE — TELEPHONE ENCOUNTER
Patient notified of provider's message as written below. Patient was notified where the Prescription(s) was sent. The patient was warm transferred to central scheduling and they were asked to assist patient in making a lab appointment in 2-8 weeks. They verified they understood. Patient verbalized good understanding, had no further questions and needed no further support.Rhea Oconnor R.N.

## 2024-08-28 NOTE — TELEPHONE ENCOUNTER
----- Message from Avi Quinn sent at 8/28/2024  6:58 AM CDT -----  Please contact patient,    Ms. Zayas,     You are positive for C. difficile as discussed during the visit.  I have sent to the pharmacy a prescription for vancomycin.  1 tablet 4 times daily for the next 10 days.  If you do not have improvement of symptoms follow-up in 2 weeks for further discussion.      Your creatinine (kidney function) slightly elevated, however, this can be seen with dehydration.  Continue to push fluids and stay hydrated.  The rest of your labs are essentially normal.  I will place a future order to recheck kidney function.  Please make a lab only appointment to have this completed in 2 to 8 weeks    Sincerely,   Avi Quinn PA-C

## 2024-09-03 NOTE — TELEPHONE ENCOUNTER
PA Initiation    Medication: VANCOMYCIN  MG PO CAPS  Insurance Company: [employer] - Phone [phone] Fax [phone]  Pharmacy Filling the Rx: [id number] - [place], [place] - [address]  AT [facility]  Filling Pharmacy Phone: [phone]  Filling Pharmacy Fax:    Start Date: 9/3/2024

## 2024-09-04 NOTE — TELEPHONE ENCOUNTER
Prior Authorization Approval    Medication: VANCOMYCIN  MG PO CAPS  Authorization Effective Date: 9/3/2024  Authorization Expiration Date: 9/3/2025  Approved Dose/Quantity: 1 capsule 4 times daily  Reference #: [id number]   Insurance Company: [employer] - Phone [phone] Fax [phone]  Which Pharmacy is filling the prescription: [id number] - [place], [place] - [address]  AT [facility]  Pharmacy Notified: YES  Patient Notified: YES filling pharmacy will notify pt for

## 2024-09-18 ENCOUNTER — LAB (OUTPATIENT)
Dept: LAB | Facility: CLINIC | Age: 34
End: 2024-09-18
Payer: COMMERCIAL

## 2024-09-18 DIAGNOSIS — A04.72 C. DIFFICILE DIARRHEA: ICD-10-CM

## 2024-09-18 LAB
ANION GAP SERPL CALCULATED.3IONS-SCNC: 11 MMOL/L (ref 7–15)
BUN SERPL-MCNC: 11.1 MG/DL (ref 6–20)
CALCIUM SERPL-MCNC: 8.8 MG/DL (ref 8.8–10.4)
CHLORIDE SERPL-SCNC: 106 MMOL/L (ref 98–107)
CREAT SERPL-MCNC: 1 MG/DL (ref 0.51–0.95)
EGFRCR SERPLBLD CKD-EPI 2021: 76 ML/MIN/1.73M2
GLUCOSE SERPL-MCNC: 68 MG/DL (ref 70–99)
HCO3 SERPL-SCNC: 21 MMOL/L (ref 22–29)
POTASSIUM SERPL-SCNC: 4 MMOL/L (ref 3.4–5.3)
SODIUM SERPL-SCNC: 138 MMOL/L (ref 135–145)

## 2024-09-18 PROCEDURE — 80048 BASIC METABOLIC PNL TOTAL CA: CPT

## 2024-09-18 PROCEDURE — 36415 COLL VENOUS BLD VENIPUNCTURE: CPT

## 2024-11-08 ENCOUNTER — OFFICE VISIT (OUTPATIENT)
Dept: OPTOMETRY | Facility: CLINIC | Age: 34
End: 2024-11-08
Payer: COMMERCIAL

## 2024-11-08 DIAGNOSIS — H52.223 REGULAR ASTIGMATISM OF BOTH EYES: Primary | ICD-10-CM

## 2024-11-08 DIAGNOSIS — H52.03 HYPEROPIA, BILATERAL: ICD-10-CM

## 2024-11-08 PROCEDURE — 92004 COMPRE OPH EXAM NEW PT 1/>: CPT | Performed by: OPTOMETRIST

## 2024-11-08 PROCEDURE — 92015 DETERMINE REFRACTIVE STATE: CPT | Performed by: OPTOMETRIST

## 2024-11-08 ASSESSMENT — REFRACTION_MANIFEST
OD_AXIS: 083
OS_AXIS: 090
METHOD_AUTOREFRACTION: 1
OS_SPHERE: PLANO
OS_CYLINDER: +0.75
OD_CYLINDER: +1.25
OS_CYLINDER: +0.50
OS_SPHERE: +0.00
OD_CYLINDER: +1.25
OD_SPHERE: +0.25
OS_AXIS: 091
OD_SPHERE: -0.25
OD_AXIS: 085

## 2024-11-08 ASSESSMENT — CONF VISUAL FIELD
METHOD: COUNTING FINGERS
OD_SUPERIOR_TEMPORAL_RESTRICTION: 0
OS_SUPERIOR_NASAL_RESTRICTION: 0
OS_SUPERIOR_TEMPORAL_RESTRICTION: 0
OD_NORMAL: 1
OS_NORMAL: 1
OD_INFERIOR_NASAL_RESTRICTION: 0
OD_SUPERIOR_NASAL_RESTRICTION: 0
OS_INFERIOR_NASAL_RESTRICTION: 0
OS_INFERIOR_TEMPORAL_RESTRICTION: 0
OD_INFERIOR_TEMPORAL_RESTRICTION: 0

## 2024-11-08 ASSESSMENT — SLIT LAMP EXAM - LIDS
COMMENTS: NORMAL
COMMENTS: NORMAL

## 2024-11-08 ASSESSMENT — TONOMETRY
OS_IOP_MMHG: 16
IOP_METHOD: TONOPEN
OD_IOP_MMHG: 16

## 2024-11-08 ASSESSMENT — CUP TO DISC RATIO
OS_RATIO: 0.25
OD_RATIO: 0.25

## 2024-11-08 ASSESSMENT — VISUAL ACUITY
OD_SC: 20/25-2
METHOD: SNELLEN - LINEAR
OD_SC: 20/25
OS_SC+: -2
OS_SC: 20/25-1
OS_SC: 20/20

## 2024-11-08 ASSESSMENT — EXTERNAL EXAM - LEFT EYE: OS_EXAM: NORMAL

## 2024-11-08 ASSESSMENT — KERATOMETRY
OD_K2POWER_DIOPTERS: 44.00
OS_K2POWER_DIOPTERS: 44.75
OS_K1POWER_DIOPTERS: 42.75
OS_AXISANGLE2_DEGREES: 180
OD_K1POWER_DIOPTERS: 42.00
OD_AXISANGLE2_DEGREES: 168

## 2024-11-08 ASSESSMENT — EXTERNAL EXAM - RIGHT EYE: OD_EXAM: NORMAL
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
## 2024-11-08 NOTE — PATIENT INSTRUCTIONS
Astigmatism results from curvature differential in the cornea and crystalline lens which can cause a distorted image, as light rays are prevented from meeting at a common focus.     Hyperopia is far-sightedness. Hyperopia is commonly rooted from a petite eye length. That results in the light's focus behind the retina.       Eyeglass prescription given.      The affects of the dilating drops last for 4- 6 hours.  You will be more sensitive to light and vision will be blurry up close.  Do not drive if you do not feel comfortable.  Mydriatic sunglasses were given if needed.    Recommend annual eye exams.    Zulema Salazar O.D.  [facility]  [address] [place] [address] [phone]

## 2024-11-14 ENCOUNTER — MYC REFILL (OUTPATIENT)
Dept: DERMATOLOGY | Facility: CLINIC | Age: 34
End: 2024-11-14
Payer: COMMERCIAL

## 2024-11-14 ENCOUNTER — APPOINTMENT (OUTPATIENT)
Dept: OPTOMETRY | Facility: CLINIC | Age: 34
End: 2024-11-14
Payer: COMMERCIAL

## 2024-11-14 DIAGNOSIS — L70.0 ACNE VULGARIS: ICD-10-CM

## 2024-11-14 PROCEDURE — 92340 FIT SPECTACLES MONOFOCAL: CPT | Performed by: OPTOMETRIST

## 2025-02-04 ENCOUNTER — TELEPHONE (OUTPATIENT)
Dept: OPTOMETRY | Facility: CLINIC | Age: 35
End: 2025-02-04
Payer: COMMERCIAL

## 2025-02-04 NOTE — TELEPHONE ENCOUNTER
[facility] Call Center    Phone Message    May a detailed message be left on voicemail: yes     Reason for Call: Other: Pt is wondering if she would qualify for a tint for her windshield in her car as she feels it would help her to see better. Please call pt to discuss. Thank you.     Action Taken: Message routed to:  Other: [facility]    Travel Screening: Not Applicable     Date of Service:

## 2025-03-14 NOTE — PROGRESS NOTES
[facility] Dermatology Note  Encounter Date: Apr 7, 2025  Office Visit     Reviewed patients past medical history and pertinent chart review prior to patients visit today.     Dermatology Problem List:  Last skin check: 04/07/2025    0. NUB midline thoracic back, left mid back,  shave biopsy 04/07/25 .     1. 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 Acne vulgaris with possible dermatitis  - Current tx: metrocream  3. Benign Biopsies   - Compound melanocytic nevus with mild atypia -  left popliteal fossa -bx 3/16/21  - Intradermal melanocytic nevus -  right neck -bx 3/16/21 - pt requested removal  - Predominantly intradermal melanocytic nevus -  left abdomen -bx 3/16/21- pt requested removal     Patient is currently Pre-Med    Personal Hx: no personal history of skin cancer  Family Hx: No family history of skin cancer   _________________________________________    Assessment & Plan:     # Neoplasm of uncertain behavior:  midline thoracic back  DDx includes DN vs MM. Shave biopsy today.  # Neoplasm of uncertain behavior:  left mid back  DDx includes DN vs MM. Shave biopsy today.    Procedure Note: Biopsy by shave technique  The risks and benefits of the procedure were described to the patient. These include but are not limited to bleeding, infection, scar, incomplete removal, and non-diagnostic biopsy. Verbal informed consent was obtained. The above site(s) was cleansed with an alcohol pad and injected with 1% lidocaine with epinephrine. Once anesthesia was obtained, a biopsy(ies) was performed with Gilette blade. The tissue(s) was placed in a labeled container(s) with formalin and sent to pathology. Hemostasis was achieved with aluminum chloride. Vaseline and a bandage were applied to the wound(s). The patient tolerated the procedure well and was given post biopsy care instructions.      # Acne vulgaris with possible dermatitis   -well controlled with metronidazole. Refills provided. Continue applying twice daily.     # Benign skin findings including:  lentigines and benign nevi.   - No further intervention required. Patient to report changes.   - Patient reassured of the benign nature of these lesions.    #Signs and Symptoms of non-melanoma skin cancer and ABCDEs of melanoma reviewed with patient. Patient encouraged to perform monthly self skin exams and educated on how to perform them. UV precautions reviewed with patient. Patient was asked about new or changing moles/lesions on body.     #Reviewed Sunscreen: Apply 20 minutes prior to going outdoors and reapply every two hours, when wet or sweating. We recommend using an SPF 30 or higher, and to use one that is water resistant.       Follow-up:  1 year(s) for follow up full body skin exam, prn for new or changing lesions or new concerns    Chantal Snell PA-C  [facility]  Dermatology     ____________________________________________    CC: Skin Check (FBSC- no specific area of concern. )    HPI:  Ms. Vi Zayas is a(n) 34 year old female who presents today as a return patient for a full body skin cancer screening. Patient has no specific cutaneous concerns today. Tries being diligent with photoprotection.     Patient is otherwise feeling well, without additional skin concerns.     Physical Exam:  Vitals: There were no vitals taken for this visit.  SKIN: Total skin excluding the genitalia areas was performed. The exam included the head/face, neck, both arms, chest, back, abdomen, both legs, digits, mons pubis, buttock and nails.   -NUB, midline thoracic back, dark brown asymmetric macule  - NUB, left mid back, dark brown asymmetric macule  -multiple tan/brown flat round macules and raised papules scattered throughout trunk, extremities and head. No worrisome features for malignancy noted on examination.  -scattered tan, homogenous  macules scattered on sun exposed areas of trunk, extremities and face.           - No other lesions of concern on areas examined.     Medications:  Current Outpatient Medications   Medication Sig Dispense Refill    metroNIDAZOLE (METROCREAM) 0.75 % external cream Apply twice daily (Patient taking differently: as needed. Apply twice daily) 45 g 5    desogestrel-ethinyl estradiol (APRI) 0.15-30 MG-MCG tablet Take 1 tablet by mouth daily (Patient not taking: Reported on 4/7/2025) 84 tablet 3    ferrous gluconate (FERGON) 324 (38 Fe) MG tablet Take 1 tablet (324 mg) by mouth daily (with breakfast) (Patient not taking: Reported on 4/7/2025) 90 tablet 3     No current facility-administered medications for this visit.      Past Medical History:   Patient Active Problem List   Diagnosis    Acne vulgaris    Multiple benign nevi    History of dysplastic nevus     Past Medical History:   Diagnosis Date    NO ACTIVE PROBLEMS        CC Louisa Malone MD  [address] [facility] [address]  [place] [address] on close of this encounter.

## 2025-04-07 ENCOUNTER — OFFICE VISIT (OUTPATIENT)
Dept: DERMATOLOGY | Facility: CLINIC | Age: 35
End: 2025-04-07
Attending: DERMATOLOGY
Payer: COMMERCIAL

## 2025-04-07 DIAGNOSIS — D48.5 NEOPLASM OF UNCERTAIN BEHAVIOR OF SKIN: Primary | ICD-10-CM

## 2025-04-07 DIAGNOSIS — L70.0 ACNE VULGARIS: ICD-10-CM

## 2025-04-07 DIAGNOSIS — Z12.83 ENCOUNTER FOR SCREENING FOR MALIGNANT NEOPLASM OF SKIN: ICD-10-CM

## 2025-04-07 DIAGNOSIS — D22.9 MULTIPLE BENIGN NEVI: ICD-10-CM

## 2025-04-07 DIAGNOSIS — L81.4 SOLAR LENTIGINOSIS: ICD-10-CM

## 2025-04-07 PROCEDURE — 88342 IMHCHEM/IMCYTCHM 1ST ANTB: CPT | Performed by: PATHOLOGY

## 2025-04-07 PROCEDURE — 11103 TANGNTL BX SKIN EA SEP/ADDL: CPT | Performed by: STUDENT IN AN ORGANIZED HEALTH CARE EDUCATION/TRAINING PROGRAM

## 2025-04-07 PROCEDURE — 1126F AMNT PAIN NOTED NONE PRSNT: CPT | Performed by: STUDENT IN AN ORGANIZED HEALTH CARE EDUCATION/TRAINING PROGRAM

## 2025-04-07 PROCEDURE — 99213 OFFICE O/P EST LOW 20 MIN: CPT | Mod: 25 | Performed by: STUDENT IN AN ORGANIZED HEALTH CARE EDUCATION/TRAINING PROGRAM

## 2025-04-07 PROCEDURE — 11102 TANGNTL BX SKIN SINGLE LES: CPT | Performed by: STUDENT IN AN ORGANIZED HEALTH CARE EDUCATION/TRAINING PROGRAM

## 2025-04-07 PROCEDURE — 88341 IMHCHEM/IMCYTCHM EA ADD ANTB: CPT | Performed by: PATHOLOGY

## 2025-04-07 PROCEDURE — 88305 TISSUE EXAM BY PATHOLOGIST: CPT | Performed by: PATHOLOGY

## 2025-04-07 ASSESSMENT — PAIN SCALES - GENERAL: PAINLEVEL_OUTOF10: NO PAIN (0)

## 2025-04-07 NOTE — LETTER
4/7/2025      Vi Zayas  [address] [facility] [address]  [place] [address]      Dear Colleague,    Thank you for referring your patient, Vi Zayas, to the [facility]. Please see a copy of my visit note below.    [facility] Dermatology Note  Encounter Date: Apr 7, 2025  Office Visit     Reviewed patients past medical history and pertinent chart review prior to patients visit today.     Dermatology Problem List:  Last skin check: 04/07/2025    0. NUB midline thoracic back, left mid back,  shave biopsy 04/07/25 .     1. 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 Acne vulgaris with possible dermatitis  - Current tx: metrocream  3. Benign Biopsies   - Compound melanocytic nevus with mild atypia -  left popliteal fossa -bx 3/16/21  - Intradermal melanocytic nevus -  right neck -bx 3/16/21 - pt requested removal  - Predominantly intradermal melanocytic nevus -  left abdomen -bx 3/16/21- pt requested removal     Patient is currently Pre-Med    Personal Hx: no personal history of skin cancer  Family Hx: No family history of skin cancer   _________________________________________    Assessment & Plan:     # Neoplasm of uncertain behavior:  midline thoracic back  DDx includes DN vs MM. Shave biopsy today.  # Neoplasm of uncertain behavior:  left mid back  DDx includes DN vs MM. Shave biopsy today.    Procedure Note: Biopsy by shave technique  The risks and benefits of the procedure were described to the patient. These include but are not limited to bleeding, infection, scar, incomplete removal, and non-diagnostic biopsy. Verbal informed consent was obtained. The above site(s) was cleansed with an alcohol pad and injected with 1% lidocaine with epinephrine. Once anesthesia was obtained, a biopsy(ies) was performed with Gilette blade. The tissue(s) was placed in a labeled container(s)  with formalin and sent to pathology. Hemostasis was achieved with aluminum chloride. Vaseline and a bandage were applied to the wound(s). The patient tolerated the procedure well and was given post biopsy care instructions.     # Acne vulgaris with possible dermatitis   -well controlled with metronidazole. Refills provided. Continue applying twice daily.     # Benign skin findings including:  lentigines and benign nevi.   - No further intervention required. Patient to report changes.   - Patient reassured of the benign nature of these lesions.    #Signs and Symptoms of non-melanoma skin cancer and ABCDEs of melanoma reviewed with patient. Patient encouraged to perform monthly self skin exams and educated on how to perform them. UV precautions reviewed with patient. Patient was asked about new or changing moles/lesions on body.     #Reviewed Sunscreen: Apply 20 minutes prior to going outdoors and reapply every two hours, when wet or sweating. We recommend using an SPF 30 or higher, and to use one that is water resistant.       Follow-up:  1 year(s) for follow up full body skin exam, prn for new or changing lesions or new concerns    Chantal Snell PA-C  [facility]  Dermatology     ____________________________________________    CC: Skin Check (FBSC- no specific area of concern. )    HPI:  Ms. Vi Zayas is a(n) 34 year old female who presents today as a return patient for a full body skin cancer screening. Patient has no specific cutaneous concerns today. Tries being diligent with photoprotection.     Patient is otherwise feeling well, without additional skin concerns.     Physical Exam:  Vitals: There were no vitals taken for this visit.  SKIN: Total skin excluding the genitalia areas was performed. The exam included the head/face, neck, both arms, chest, back, abdomen, both legs, digits, mons pubis, buttock and nails.   -NUB, midline thoracic back, dark brown asymmetric macule  - NUB, left mid back, dark  brown asymmetric macule  -multiple tan/brown flat round macules and raised papules scattered throughout trunk, extremities and head. No worrisome features for malignancy noted on examination.  -scattered tan, homogenous macules scattered on sun exposed areas of trunk, extremities and face.           - No other lesions of concern on areas examined.     Medications:  Current Outpatient Medications   Medication Sig Dispense Refill     metroNIDAZOLE (METROCREAM) 0.75 % external cream Apply twice daily (Patient taking differently: as needed. Apply twice daily) 45 g 5     desogestrel-ethinyl estradiol (APRI) 0.15-30 MG-MCG tablet Take 1 tablet by mouth daily (Patient not taking: Reported on 4/7/2025) 84 tablet 3     ferrous gluconate (FERGON) 324 (38 Fe) MG tablet Take 1 tablet (324 mg) by mouth daily (with breakfast) (Patient not taking: Reported on 4/7/2025) 90 tablet 3     No current facility-administered medications for this visit.      Past Medical History:   Patient Active Problem List   Diagnosis     Acne vulgaris     Multiple benign nevi     History of dysplastic nevus     Past Medical History:   Diagnosis Date     NO ACTIVE PROBLEMS        CC Louisa Malone MD  [address] [facility] [address]  [place] [address] on close of this encounter.        Again, thank you for allowing me to participate in the care of your patient.        Sincerely,        Chantal Snell PA-C    Electronically signed

## 2025-04-07 NOTE — NURSING NOTE
Vi Zayas's chief complaint for this visit includes:  Chief Complaint   Patient presents with    Skin Check     FBSC- no specific area of concern.      PCP: No Ref-Primary, Physician    Referring Provider:  Louisa Malone MD  [address] [place] [id number]    There were no vitals taken for this visit.  No Pain (0)      No Known Allergies      Do you need any medication refills at today's visit?   No.      Micaela Bass RN on 4/7/2025 at 4:16 PM

## 2025-04-07 NOTE — NURSING NOTE
The following medication was given:     MEDICATION:  Lidocaine with epinephrine 1% 1:[id number]  ROUTE: SQ  SITE: see procedure note  DOSE: 1 mL  LOT #: [id number]  : Seevibes  EXPIRATION DATE: 11-  NDC#: 6390-1851-52  Was there drug waste? NO  Multi-dose vial: Yes    Micaela Bass RN  April 7, 2025

## 2025-04-10 ENCOUNTER — TELEPHONE (OUTPATIENT)
Dept: DERMATOLOGY | Facility: CLINIC | Age: 35
End: 2025-04-10
Payer: COMMERCIAL

## 2025-04-10 NOTE — TELEPHONE ENCOUNTER
Excision/Mohs previsit information                                                    Diagnosis: moderately to Severely atypical nevus   Site(s): Midline thoracic back    Is the surgical site below the waist?  NO  If yes, instruct the patient to purchase over the counter chlorhexidine surgical soap and wash all skin below the belly button twice before surgery    No Known Allergies    Review and update allergy and medication list    Do you take the following medications:  Coumadin, Eliquis, Pradaxa, Xarelto:  NO.  If on Coumadin, INR should be checked within 7 days of surgery.  Range should be 3.5 or less or within therapeutic range.    Do you currently or have you previously had any of the following conditions:  Hepatitis:  NO  HIV/AIDS:  NO  Prolonged bleeding or bleeding disorder:  NO  Pacemaker: NO  Defibrillator:  NO  History of artificial or heart valve replacement:  NO  Endocarditis (inflammation of the inner lining of the heart's chambers and valves):  NO  Have you ever had a prosthetic joint infection:  NO  Pregnant or Breastfeeding:  NO  Mobility device (wheelchair, transfer difficulty): NO    Important Reminders:                                                      -For Mohs, notify patient they may be here through the lunch hour.  Be prepared to have down time and we recommend they bring a book or something to do.  -Ok to take all of their medications as prescribed  -Notify patient to eat prior to appointment.  The medication is more likely to cause you to feel jittery if you have an empty stomach.  -If face is being treated, please come with a make-up free face  -Avoid wearing earrings and necklaces  -If scalp is being treated, please come with clean hair free from product  -Patient will not be able to get the site wet for 48 hrs  -No submerging wound in standing water (lake, pool, bathtub, hot tub) for 2 weeks  -No physical activity for 48 hrs (further restrictions will be discussed by MD at time of  visit)    If any positives, send to RN for further review  Penny Eric RN       Writer called pt to discuss pathology results. Pt scheduled for excision. Excision checklist complete and all questions were negative. Pt denied having any questions or concerns at this time.    Penny Eric RN on 4/10/2025 at 3:53 PM      Final Diagnosis  A(1). Skin, midline thoracic back, shave:  - Compound dysplastic nevus with moderate to severe atypia - (see comment)      B(2). Skin, left mid back, shave:  - Compound dysplastic nevus with moderate atypia - (see description)      Electronically signed by Mayo Tristan MD on 4/10/2025 at 11:45 AM      Result Notes     Chantal Snell PA-C  4/10/2025 12:58 PM CDT Back to Top    Please let patient know that biopsy(s) showed the following with the below treatment recommendations:     A. Midline thoracic back, moderately to Severely atypical nevus, wide local excision needed  B. Left mid back, moderately atypical nevus, no further treatment needed at this time.     Can schedule excision with me on a wed at 345

## 2025-05-06 RX ORDER — DESOGESTREL AND ETHINYL ESTRADIOL 0.15-0.03
1 KIT ORAL DAILY
Qty: 84 TABLET | Refills: 3 | Status: CANCELLED | OUTPATIENT
Start: 2025-05-06

## 2025-05-07 ENCOUNTER — OFFICE VISIT (OUTPATIENT)
Dept: OBGYN | Facility: CLINIC | Age: 35
End: 2025-05-07
Payer: COMMERCIAL

## 2025-05-07 VITALS
SYSTOLIC BLOOD PRESSURE: 122 MMHG | WEIGHT: 124.8 LBS | HEART RATE: 68 BPM | DIASTOLIC BLOOD PRESSURE: 74 MMHG | BODY MASS INDEX: 19.59 KG/M2 | OXYGEN SATURATION: 98 % | HEIGHT: 67 IN

## 2025-05-07 DIAGNOSIS — Z01.419 ENCOUNTER FOR ANNUAL ROUTINE GYNECOLOGICAL EXAMINATION: Primary | ICD-10-CM

## 2025-05-07 DIAGNOSIS — Z30.09 ENCOUNTER FOR OTHER GENERAL COUNSELING OR ADVICE ON CONTRACEPTION: ICD-10-CM

## 2025-05-07 DIAGNOSIS — Z13.6 CARDIOVASCULAR SCREENING; LDL GOAL LESS THAN 130: ICD-10-CM

## 2025-05-07 PROCEDURE — 3074F SYST BP LT 130 MM HG: CPT | Performed by: NURSE PRACTITIONER

## 2025-05-07 PROCEDURE — 99459 PELVIC EXAMINATION: CPT | Performed by: NURSE PRACTITIONER

## 2025-05-07 PROCEDURE — 99395 PREV VISIT EST AGE 18-39: CPT | Performed by: NURSE PRACTITIONER

## 2025-05-07 PROCEDURE — 3078F DIAST BP <80 MM HG: CPT | Performed by: NURSE PRACTITIONER

## 2025-05-07 NOTE — PATIENT INSTRUCTIONS
If you have any questions regarding your visit, Please contact your care team.     [facility] [facility]: [phone]  To Schedule an Appointment 24/7  Call: [phone]     Lisa Mila- APRN CNP      [facility]                   Monday 7:30am-2:00pm    Tuesday 7:30am-4:00pm    Wednesday 7:30am-2:00pm    Thursday 7:30am-11:00am    Friday 7:30am-2:00pm [address] [address]  Phone- [phone]   Fax- [phone]     Imaging Scheduling all locations  [phone]    [facility]  [address] [place] [address] [place] [address] [phone]         Urgent Care locations:  [facility]   Monday-Friday  10 am - 8 pm  Saturday and Sunday   9 am - 5 pm     [phone]   If you need a medication refill, please contact your pharmacy. Please allow 3 business days for your refill to be completed.  As always, Thank you for trusting us with your healthcare needs!      see additional instructions from your care team below

## 2025-05-07 NOTE — PROGRESS NOTES
"   SUBJECTIVE:   Vi Zayas  is a 34 year old, presenting for the following health issues:  Physical  HPI     Patient stopped oral contraceptive pills about 3 months ago. Was noting headaches for 1-2 days around start of menses. Would resolve with Tylenol, but now has not had headaches since being off pills. Using condoms for now.        4/7/2025     4:15 PM 4/4/2024     2:32 PM   PHQ-2 ( 1999 [employer])   Q1: Little interest or pleasure in doing things 0 0   Q2: Feeling down, depressed or hopeless 0 0   PHQ-2 Score 0 0         Reviewed orders with patient.  Reviewed health maintenance and updated orders accordingly - Yes  Patient Active Problem List   Diagnosis    Acne vulgaris    Multiple benign nevi    History of dysplastic nevus     Past Surgical History:   Procedure Laterality Date    NO HISTORY OF SURGERY         Social History     Tobacco Use    Smoking status: Never    Smokeless tobacco: Never   Substance Use Topics    Alcohol use: Yes     Comment: rarely     Family History   Problem Relation Age of Onset    Stomach Cancer Paternal Grandmother     Glaucoma No family hx of     Macular Degeneration No family hx of            Breast Cancer Screening:   Mammogram Screening - Patient under 40 years of age: Routine Mammogram Screening not recommended.   Pertinent mammograms are reviewed under the imaging tab.    History of abnormal Pap smear: No - age 30- 64 PAP with HPV every 5 years recommended    Past Medical History:   Diagnosis Date    NO ACTIVE PROBLEMS       Past Surgical History:   Procedure Laterality Date    NO HISTORY OF SURGERY           Review of Systems  Constitutional, neuro, ENT, endocrine, pulmonary, cardiac, gastrointestinal, genitourinary, musculoskeletal, integument and psychiatric systems are negative, except as otherwise noted.    OBJECTIVE:   /74 (BP Location: Right arm, Patient Position: Sitting, Cuff Size: Adult Regular)   Pulse 68   Ht 1.69 m (5' 6.54\")   Wt 56.6 kg (124 lb 12.8 oz) "   LMP 04/16/2025 (Exact Date)   SpO2 98%   BMI 19.82 kg/m    Physical Exam   GENERAL: alert and no distress  RESP: lungs clear to auscultation - no rales, rhonchi or wheezes  BREAST: normal without masses, tenderness or nipple discharge and no palpable axillary masses or adenopathy  CV: regular rate and rhythm, normal S1 S2, no S3 or S4, no murmur, click or rub, no peripheral edema  ABDOMEN: soft, nontender, no hepatosplenomegaly, no masses and bowel sounds normal   (female) w/bimanual: normal female external genitalia, normal urethral meatus, normal vaginal mucosa, and normal cervix/adnexa/uterus without masses or discharge  MS: no gross musculoskeletal defects noted, no edema  SKIN: no suspicious lesions or rashes  NEURO: Normal strength and tone, mentation intact and speech normal  PSYCH: mentation appears normal, affect normal/bright      ASSESSMENT/PLAN:   Encounter for annual routine gynecological examination  Health Maintenance reviewed.    CARDIOVASCULAR SCREENING; LDL GOAL LESS THAN 130  - Lipid panel reflex to direct LDL Fasting; Future    Encounter for other general counseling or advice on contraception  We discussed her headaches and contraception. Reviewed alternate options, continuous cycling on her pills. At this time, plans to continue to track cycles and use condoms, will let me know if alternate contraception desired.    Counseling  Reviewed preventive health counseling, as reflected in patient instructions  Special attention given to:        Regular exercise       Healthy diet/nutrition       Contraception      Signed Electronically by: KY Ziegler CNP

## 2025-06-06 NOTE — PROGRESS NOTES
DERMATOLOGY EXCISION PROCEDURE NOTE    Dermatology Problem List:  Last skin check: 4/7/2025    1. History of DN  - Compound dysplastic nevus with moderate to severe atypia, midline thoracic back s/p bx 4/7/25 s/p pending excision 07/02/2025   - Compound dysplastic nevus with moderate atypia, left mid back, s/p bx 04/07/2025  - Junctional DN with Mod-Sev Atypia, R upper lateral back s/p excision 7/24/23  - Junctional DN, R posterior shoulder, sp biopsy 4/26/2022  2. Acne vulgaris with possible dermatitis  - Current tx: metrocream  3. Benign Biopsies   - Compound melanocytic nevus with mild atypia -  left popliteal fossa -bx 3/16/21  - Intradermal melanocytic nevus -  right neck -bx 3/16/21 - pt requested removal  - Predominantly intradermal melanocytic nevus -  left abdomen -bx 3/16/21- pt requested removal     Patient is currently Pre-Med     Personal Hx: no personal history of skin cancer  Family Hx: No family history of skin cancer   _______________________    NAME OF PROCEDURE: Excision with intermediate linear closure  Staff: Chantal Snell PA-C  Scrub Nurse: Micaela Bass RN    PRE-OPERATIVE DIAGNOSIS:  Dysplastic nevus  POST-OPERATIVE DIAGNOSIS: Same   LOCATION: midline thoracic back  FINAL EXCISION SIZE(DEFECT SIZE): 1.7 x 1.5 cm  MARGIN: 5 mm  FINAL REPAIR LENGTH: 3.4 cm   ANESTHESIA: 1% lidocaine with 1:100,000 epinephrine    INDICATIONS: This patient presented with a 0.5 x 0.7 cm Dysplastic nevus. Excision was indicated. We discussed the principles of treatment and most likely complications including scarring, bleeding, infection, incomplete excision, wound dehiscence, pain, nerve damage, and recurrence. Informed consent was obtained and the patient underwent the procedure as follows:    PROCEDURE: The patient was taken to the operative suite. Time-out was performed. The treatment area was anesthetized with 1% lidocaine with epinephrine. The area was prepped with Chlorhexidine and rinsed with sterile  saline and draped with sterile towels. The lesion was delineated and excised down to subcutaneous fat in a elliptical manner. Hemostasis was obtained by electrocoagulation.     REPAIR: An intermediate layered linear closure was selected as the procedure which would maximally preserve both function and cosmesis.    After the excision of the tumor, the area was carefully undermined. Hemostasis was obtained with bipolar electrocoagulation. Closure was oriented so that the wound was in the patient's natural skin tension lines. The deeper layers of subcutaneous and superficial (nonmuscle) fascia tissues were then approximated using 3-0 vicryl and 4-0 monocryl sutures (deep layer suturing). The wound edges were then approximated; additional buried sutures were placed in a similar fashion where needed. 4-0 monocryl sutures (superficial layer suturing) were carefully placed for maximum eversion and meticulous approximation.    Estimated blood loss was less than 10 ml for all surgical sites. A sterile pressure dressing was applied and wound care instructions, with a written handout, were given. The [facility] and ambulatory.    The patient elected for pathology results to automatically release and understands that the clinical staff will contact them as soon as possible to notify them of the results.    Anatomic Pathology Results: pending    Clinical Follow-Up: 4/22/2026 for next [facility] [place], sooner if needed    Staff Involved:  Staff Only

## 2025-06-06 NOTE — PATIENT INSTRUCTIONS
Caring for your skin after surgery    For the first 48 hours after your surgery:  Leave the pressure dressing on and keep it dry. If it should come loose, you may re-tape it, but do not take it off.  Relax and take it easy.  Do not do any vigorous exercise, heavy lifting or bending forward. This could cause the wound to bleed.  If the wound is on your head, sleeping with your head elevated for the first few nights will help with swelling and bleeding. (Use linens/pillow cases that would be ok to get blood on in the event there is some oozing from the bandage).  Post-operative pain is usually mild.  You may alternate between 1000 mg of Tylenol (acetaminophen) and 400 mg of Ibuprofen every 4 hours.  This means, for example, that you could take the followin,000 mg of Tylenol, followed 4 hours later by 400 mg Ibuprofen, followed 4 hours later with 1,000 mg of Tylenol, and so forth.  Do not take more than 4,000 mg of acetaminophen in a 24-hour period or 3200 mg of Ibuprofen in a 24-hr period.    Avoid alcohol and vitamin E as these may increase your tendency to bleed.  Apply an ice pack for 20 min every 2-3 hours while awake.  This may help reduce swelling, bruising, and pain.  Make sure the ice pack is waterproof so that the pressure bandage doesn't get wet.  You may see a small amount of drainage or blood on your pressure bandage. This is normal. However:  If you have bleeding saturating the bandage, you will need to apply firm pressure over the bandage with a clean washcloth for 15 minutes. (Your Tegaderm is no longer intact and you will need to do daily wound care as listed below).  Remove the saturated bandage & Tegaderm.  If bleeding has stopped, apply Vaseline over the suture line and cover with a non-stick bandage. To add some pressure over the wound, fold a piece of gauze to tape over the area.  If bleeding continues after applying pressure for 15 minutes, apply an ice pack with gentle pressure to the  bandaged area for another 15 minutes.  If bleeding still continues, call our office or go to the nearest emergency room.    48 Hours After Surgery:  Remove outer white bandage down to clear plastic film (Tegaderm).  You may notice dark brown, dried blood under the Tegaderm.  This is normal.  Leave the clear plastic film (Tegaderm) on for 2 weeks, as long as it is intact.  If it falls off prior to 2 weeks follow daily wound care below.  If it stays intact for the full 2 weeks, then remove, cleanse skin and treat as normal, healthy skin.    Daily Wound Care (if Tegaderm is no longer intact):  Remove bandage  Wash wound with a mild soap and water.  Use caution when washing the wound, be gentle and do not let the forceful shower stream hit the wound directly.   Pat dry.  Apply Vaseline or Aquaphor ointment (from a new container or tube) over the suture line with a Q-tip.  Cover the site with a bandage.  Do this daily until you reach the 2 week lizzie.    What to expect:  The first couple of days your wound may be tender.  There may be swelling and bruising around the wound, especially if it is near the eyes. For your comfort, you may apply ice or cold compresses to the area.  The area around your wound may be numb for several weeks or even months.  You may experience periodic sharp pain or mild itching around the wound as it heals.    Call Us If:  You have bleeding that will not stop after applying pressure and ice.  You have pain that is not controlled with Tylenol and Ibuprofen.  You have signs or symptoms of an infection such as fever over 100 degrees Fahrenheit, redness, swelling, or warmth spreading from the wound, increasing pain after the first 48 hours, or white/yellow/green drainage from the wound (may or may not have a foul odor).    [facility], Dermatology [phone].  Available - 8.  For urgent needs outside of business hours, call the [facility] at [phone] and  ask to speak with/page the dermatology resident on call.

## 2025-07-02 ENCOUNTER — OFFICE VISIT (OUTPATIENT)
Dept: DERMATOLOGY | Facility: CLINIC | Age: 35
End: 2025-07-02
Payer: COMMERCIAL

## 2025-07-02 VITALS — SYSTOLIC BLOOD PRESSURE: 99 MMHG | DIASTOLIC BLOOD PRESSURE: 70 MMHG | OXYGEN SATURATION: 98 % | HEART RATE: 72 BPM

## 2025-07-02 DIAGNOSIS — D23.9 DYSPLASTIC NEVUS: Primary | ICD-10-CM

## 2025-07-02 ASSESSMENT — PAIN SCALES - GENERAL: PAINLEVEL_OUTOF10: NO PAIN (0)

## 2025-07-02 NOTE — NURSING NOTE
Vi Zayas's chief complaint for this visit includes:  Chief Complaint   Patient presents with    Procedure     Excision- midline thoracic back. Atypical DN     PCP: No Ref-Primary, Physician    Referring Provider:  Referred Self, MD  No address on file    BP 99/70 (BP Location: Left arm, Patient Position: Sitting, Cuff Size: Adult Regular)   Pulse 72   LMP 04/16/2025 (Exact Date)   SpO2 98%   No Pain (0)      No Known Allergies      Do you need any medication refills at today's visit?   No.        The following medication was given:     MEDICATION:  Lidocaine with epinephrine 1% [id number]  ROUTE: SQ  SITE: see procedure note  DOSE: 8 mL  LOT #: [id number]  : [employer]  EXPIRATION DATE: 10-  NDC#: 22212-228-95  Was there drug waste? Yes, 1 mL  Multi-dose vial: Yes    Micaela Bass RN  July 2, 2025

## 2025-07-02 NOTE — LETTER
7/2/2025      Vi Zayas  [address]  [place] [address]      Dear Colleague,    Thank you for referring your patient, Vi Zayas, to the [facility]. Please see a copy of my visit note below.    DERMATOLOGY EXCISION PROCEDURE NOTE    Dermatology Problem List:  Last skin check: 4/7/2025    1. History of DN  - Compound dysplastic nevus with moderate to severe atypia, midline thoracic back s/p bx 4/7/25 s/p pending excision 07/02/2025   - Compound dysplastic nevus with moderate atypia, left mid back, s/p bx 04/07/2025  - Junctional DN with Mod-Sev Atypia, R upper lateral back s/p excision 7/24/23  - Junctional DN, R posterior shoulder, sp biopsy 4/26/2022  2. Acne vulgaris with possible dermatitis  - Current tx: metrocream  3. Benign Biopsies   - Compound melanocytic nevus with mild atypia -  left popliteal fossa -bx 3/16/21  - Intradermal melanocytic nevus -  right neck -bx 3/16/21 - pt requested removal  - Predominantly intradermal melanocytic nevus -  left abdomen -bx 3/16/21- pt requested removal     Patient is currently Pre-Med     Personal Hx: no personal history of skin cancer  Family Hx: No family history of skin cancer   _______________________    NAME OF PROCEDURE: Excision with intermediate linear closure  Staff: Chantal Snell PA-C  Scrub Nurse: Micaela Bass RN    PRE-OPERATIVE DIAGNOSIS:  Dysplastic nevus  POST-OPERATIVE DIAGNOSIS: Same   LOCATION: midline thoracic back  FINAL EXCISION SIZE(DEFECT SIZE): 1.7 x 1.5 cm  MARGIN: 5 mm  FINAL REPAIR LENGTH: 3.4 cm   ANESTHESIA: 1% lidocaine with 1:100,000 epinephrine    INDICATIONS: This patient presented with a 0.5 x 0.7 cm Dysplastic nevus. Excision was indicated. We discussed the principles of treatment and most likely complications including scarring, bleeding, infection, incomplete excision, wound dehiscence, pain, nerve damage, and recurrence. Informed consent was obtained and the patient underwent the procedure as  follows:    PROCEDURE: The patient was taken to the operative suite. Time-out was performed. The treatment area was anesthetized with 1% lidocaine with epinephrine. The area was prepped with Chlorhexidine and rinsed with sterile saline and draped with sterile towels. The lesion was delineated and excised down to subcutaneous fat in a elliptical manner. Hemostasis was obtained by electrocoagulation.     REPAIR: An intermediate layered linear closure was selected as the procedure which would maximally preserve both function and cosmesis.    After the excision of the tumor, the area was carefully undermined. Hemostasis was obtained with bipolar electrocoagulation. Closure was oriented so that the wound was in the patient's natural skin tension lines. The deeper layers of subcutaneous and superficial (nonmuscle) fascia tissues were then approximated using 3-0 vicryl and 4-0 monocryl sutures (deep layer suturing). The wound edges were then approximated; additional buried sutures were placed in a similar fashion where needed. 4-0 monocryl sutures (superficial layer suturing) were carefully placed for maximum eversion and meticulous approximation.    Estimated blood loss was less than 10 ml for all surgical sites. A sterile pressure dressing was applied and wound care instructions, with a written handout, were given. The [facility] was [facility] alert and ambulatory.    The patient elected for pathology results to automatically release and understands that the clinical staff will contact them as soon as possible to notify them of the results.    Anatomic Pathology Results: pending    Clinical Follow-Up: 4/22/2026 for next [facility], sooner if needed    Staff Involved:  Staff Only      Again, thank you for allowing me to participate in the care of your patient.        Sincerely,        Chantal Snell PA-C    Electronically signed

## 2025-07-09 ENCOUNTER — RESULTS FOLLOW-UP (OUTPATIENT)
Dept: DERMATOLOGY | Facility: CLINIC | Age: 35
End: 2025-07-09
Payer: COMMERCIAL
